# Patient Record
Sex: FEMALE | Race: WHITE | Employment: FULL TIME | URBAN - METROPOLITAN AREA
[De-identification: names, ages, dates, MRNs, and addresses within clinical notes are randomized per-mention and may not be internally consistent; named-entity substitution may affect disease eponyms.]

---

## 2017-03-07 ENCOUNTER — OFFICE VISIT (OUTPATIENT)
Dept: FAMILY MEDICINE CLINIC | Age: 42
End: 2017-03-07

## 2017-03-07 VITALS
RESPIRATION RATE: 18 BRPM | WEIGHT: 153.8 LBS | SYSTOLIC BLOOD PRESSURE: 124 MMHG | OXYGEN SATURATION: 98 % | TEMPERATURE: 98.4 F | BODY MASS INDEX: 22.78 KG/M2 | DIASTOLIC BLOOD PRESSURE: 62 MMHG | HEIGHT: 69 IN | HEART RATE: 56 BPM

## 2017-03-07 DIAGNOSIS — Z83.438 FAMILY HISTORY OF HYPERLIPIDEMIA: ICD-10-CM

## 2017-03-07 DIAGNOSIS — J30.1 SEASONAL ALLERGIC RHINITIS DUE TO POLLEN: ICD-10-CM

## 2017-03-07 DIAGNOSIS — K58.2 IRRITABLE BOWEL SYNDROME WITH BOTH CONSTIPATION AND DIARRHEA: ICD-10-CM

## 2017-03-07 DIAGNOSIS — E73.9 LACTOSE INTOLERANCE: ICD-10-CM

## 2017-03-07 DIAGNOSIS — Z11.4 SCREENING FOR HIV (HUMAN IMMUNODEFICIENCY VIRUS): ICD-10-CM

## 2017-03-07 DIAGNOSIS — Z23 ENCOUNTER FOR IMMUNIZATION: ICD-10-CM

## 2017-03-07 DIAGNOSIS — Z31.69 ENCOUNTER FOR PRECONCEPTION CONSULTATION: ICD-10-CM

## 2017-03-07 DIAGNOSIS — K90.41 GLUTEN INTOLERANCE: ICD-10-CM

## 2017-03-07 DIAGNOSIS — Z00.00 PREVENTATIVE HEALTH CARE: Primary | ICD-10-CM

## 2017-03-07 RX ORDER — HYOSCYAMINE SULFATE 0.125 MG
125 TABLET ORAL
Qty: 100 TAB | Refills: 3 | Status: SHIPPED | OUTPATIENT
Start: 2017-03-07 | End: 2018-09-17

## 2017-03-07 RX ORDER — DICYCLOMINE HYDROCHLORIDE 10 MG/1
10 CAPSULE ORAL
Qty: 100 CAP | Refills: 3 | Status: SHIPPED | OUTPATIENT
Start: 2017-03-07 | End: 2018-09-17

## 2017-03-07 RX ORDER — BISMUTH SUBSALICYLATE 262 MG
1 TABLET,CHEWABLE ORAL DAILY
COMMUNITY

## 2017-03-07 RX ORDER — CETIRIZINE HCL 10 MG
10 TABLET ORAL
COMMUNITY

## 2017-03-07 NOTE — PROGRESS NOTES
Chief Complaint   Patient presents with    New Patient     to est. care . Wants to get set up with pcp, fairly new to [address] [place] [address] in preparation for visit and have obtained necessary documentation. Identified pt with two pt identifiers (Name @ )    Health Maintenance Due   Topic    DTaP/Tdap/Td series (1 - Tdap)    PAP AKA CERVICAL CYTOLOGY     INFLUENZA AGE 9 TO ADULT          1. Have you been to the ER, urgent care clinic since your last visit? Hospitalized since your last visit? No    2. Have you seen or consulted any other health care providers outside of the [address] [place] [address] since your last visit? Include any pap smears or colon screening.  No

## 2017-03-07 NOTE — PROGRESS NOTES
Hao Rascon [address]. [facility], [address]  [phone]             Date of visit: 3/7/17   Subjective:      History obtained from:  the patient.   Reilly Valles is a 42 y.o. female who presents today for new patient, preventive care, few concerns    Stopped taking OCPs 6 months ago and doesn't feel as well  Wants to get pregnant, scheduled to see fertility center soon due to her age, has only been trying for 6 months  Tries to track [facility], didn't happen this time  Cycles fairly regular but hard to tell what is usual for her because she was on the pill so long  Little lower abd cramping today, not sure if bowels or female parts, should be starting period soon but did not ovulate this month per [facility] tracking    Long history of GI problems, worse with stress, getting worse as she gets older  In 2012 they went to [facility], she could tell she was lactose intolerant with all the cheese  Feels like she has the flu if she eats too much bread, achy, foggy, mostly cut out gluten for past year  These measures have helped  Then started to avoid FODMAPs and thinks that helps gas some  Diarrhea every AM, lots of painful gas and bloating  If she misses a BM will get constipated, often painful, feels better after a BM  Really think stress is the problem, 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)  Doesn't get heartburn or reflux often    Sees gyn for pap and mammo, has an appointment next week      Trying to eat well, plenty of fruits and veggies  Does exercise regularly    Has used propranolol prn for hand tremors    Would like any testing available before getting pregnant  Is taking her PNV and being careful to avoid cat stool    Patient Active Problem List    Diagnosis Date Noted    Seasonal allergic rhinitis due to pollen 03/07/2017    Irritable bowel syndrome with both constipation and diarrhea 03/07/2017    Lactose intolerance 03/07/2017    Gluten intolerance 03/07/2017    Family history of hyperlipidemia 03/07/2017     Current Outpatient Prescriptions   Medication Sig Dispense Refill    cetirizine (ZYRTEC) 10 mg tablet Take 10 mg by mouth daily as needed for Allergies.  multivitamin (ONE A DAY) tablet Take 1 Tab by mouth daily.  B.infantis-B.ani-B.long-B.bifi (PROBIOTIC 4X) 10-15 mg TbEC Take  by mouth daily.  omega-3s-dha-epa-fish oil (OMEGA 3) 350-400 mg cap Take  by mouth daily.  dicyclomine (BENTYL) 10 mg capsule Take 1 Cap by mouth four (4) times daily as needed. 100 Cap 3    hyoscyamine (LEVSIN) 0.125 mg tablet Take 1 Tab by mouth every four (4) hours as needed for Cramping. 100 Tab 3     Not on File  History reviewed. No pertinent past medical history. Past Surgical History:   Procedure Laterality Date    HX WISDOM TEETH EXTRACTION       Family History   Problem Relation Age of Onset    Elevated Lipids Mother     Elevated Lipids Brother      Social History   Substance Use Topics    Smoking status: Never Smoker    Smokeless tobacco: Never Used    Alcohol use No      Comment: makes her feel hot and sick      Social History     Social History Narrative                Review of Systems  Gen denies weight changes  Endo: admits to mildly irregular periods  Neuro: admits to history of menstrual migraine but has not recently   denies urinary pain  GI denies hematochezia  All: admits to seasonal allergies, zyrtec works  Psych: denies SI or depression  CV denies chest pain  Pulm: denies difficulty breathing  Skin denies lesions       Objective:     Vitals:    03/07/17 1402   BP: 124/62   Pulse: (!) 56   Resp: 18   Temp: 98.4 °F (36.9 °C)   TempSrc: Oral   SpO2: 98%   Weight: 153 lb 12.8 oz (69.8 kg)   Height: 5' 9\" (1.753 m)     Body mass index is 22.71 kg/(m^2).      General: stated age, well-developed, and in NAD  Eyes: PERRL, EOMI, no redness or drainage  Nose: no drainage  Mouth: no lesions  Throat: erythema, exudate or swelling  Neck: supple, symmetrical, trachea midline, no adenopathy and thyroid: not enlarged, symmetric, no tenderness/mass/nodules  Lungs:  clear to auscultation w/o rales, rhonchi, wheezes w/normal effort and no use of accessory muscles of respiration   Heart: regular rate and rhythm, S1, S2 normal, no murmur, click, rub or gallop  Abdomen: soft, nontender, no masses, BS normal  Ext:  No edema noted. Lymph: no cervical adenopathy appreciated  Skin:  Normal. and no rash or abnormalities   Neuro: normal gait, CN 2-12 intact  Psych: alert and oriented to person, place, time and situation and Speech: appropriate quality, quantity and organization of sentences    Assessment/Plan:       ICD-10-CM ICD-9-CM    1. Preventative health care Z00.00 V70.0 LIPID PANEL      METABOLIC PANEL, COMPREHENSIVE      CBC WITH AUTOMATED DIFF      TSH 3RD GENERATION      T4, FREE      CELIAC ANTIBODY PROFILE   2. Irritable bowel syndrome with both constipation and diarrhea K58.2 564.1 TSH 3RD GENERATION      T4, FREE   3. Seasonal allergic rhinitis due to pollen J30.1 477.0    4. Lactose intolerance E73.9 271.3    5. Gluten intolerance K90.0 579.0 CELIAC ANTIBODY PROFILE   6. Family history of hyperlipidemia Z83.49 V18.19    7. Encounter for immunization Z23 V03.89 INFLUENZA VIRUS VACCINE,(SEASONAL),SPLIT, IN INDIVIDS. >=3 YRS OF AGE, IM   8. Screening for HIV (human immunodeficiency virus) Z11.4 V73.89 HIV 1/2 AG/AB, 4TH GENERATION,W RFLX CONFIRM   9.  Encounter for preconception consultation Z31.69 V26.49 SMN1 COPY NUMBER ANALYSIS      CYSTIC FIBROSIS MUTATIONS      HEMOGLOBIN FRACTIONATION        Orders Placed This Encounter    INFLUENZA VIRUS VACCINE,(SEASONAL),SPLIT, IN INDIVIDS. >=3 YRS OF AGE, IM    LIPID PANEL    METABOLIC PANEL, COMPREHENSIVE    CBC WITH AUTOMATED DIFF    TSH 3RD GENERATION    T4, FREE    CELIAC ANTIBODY PROFILE    HIV 1/2 AG/AB, 4TH GENERATION,W RFLX CONFIRM    SMN1 COPY NUMBER ANALYSIS    CYSTIC FIBROSIS MUTATIONS    HEMOGLOBIN FRACTIONATION    cetirizine (ZYRTEC) 10 mg tablet    multivitamin (ONE A DAY) tablet    B.infantis-B.ani-B.long-B.bifi (PROBIOTIC 4X) 10-15 mg TbEC    omega-3s-dha-epa-fish oil (OMEGA 3) 350-400 mg cap    dicyclomine (BENTYL) 10 mg capsule    hyoscyamine (LEVSIN) 0.125 mg tablet       Preventive care mostly up to date, really healthy overall  Does a lot of good exercise and eats very healthfully--encouraged her to keep this up! Will do basic labs as well as labs for IBS, possible celiac  If we can control symptoms with diet, bentyl and/or levsin (giving her both to try) no need for endoscopy, as she has had these same symptoms for 20+ years. Did discuss amitriptyline as a good option for her IBS, but for now will use prn meds as she is trying to get pregnant. As a lot of her symptoms are associated with stress, I think the amitriptyline might be a great help to her in the future  Colonoscopy will be due at 49 unless new/worsening symptoms  Thinks she has had TDAP; gave flu shot today  Zyrtec working well enough for allergies, continue  Did screening tests for pre-conception counseling  Good that she will be seeing fertility center soon    Discussed the diagnosis and plan and she expressed understanding. Follow-up Disposition:  Return in about 1 year (around 3/7/2018).  or sooner prn    Sapna Hamlin MD

## 2017-03-07 NOTE — MR AVS SNAPSHOT
Visit Information Date & Time Provider Department Dept. Phone Encounter #  
 3/7/2017  2:00 PM Carter Caban, [facility] [phone] [id number] Upcoming Health Maintenance Date Due  
 PAP AKA CERVICAL CYTOLOGY 9/3/1995 INFLUENZA AGE 9 TO ADULT 8/1/2016 DTaP/Tdap/Td series (2 - Td) 9/1/2021 Allergies as of 3/7/2017  Review Complete On: 3/7/2017 By: Carter Caban MD  
 Not on File Current Immunizations  Never Reviewed Name Date Influenza Vaccine  Incomplete Not reviewed this visit You Were Diagnosed With   
  
 Codes Comments Preventative health care    -  Primary ICD-10-CM: Z00.00 ICD-9-CM: V70.0 Seasonal allergic rhinitis due to pollen     ICD-10-CM: J30.1 ICD-9-CM: 477.0 Irritable bowel syndrome with both constipation and diarrhea     ICD-10-CM: K58.2 ICD-9-CM: 406.8 Lactose intolerance     ICD-10-CM: E73.9 ICD-9-CM: 271.3 Gluten intolerance     ICD-10-CM: K90.0 ICD-9-CM: 579.0 Family history of hyperlipidemia     ICD-10-CM: Z83.49 
ICD-9-CM: V18.19 Encounter for immunization     ICD-10-CM: X60 ICD-9-CM: V03.89 Screening for HIV (human immunodeficiency virus)     ICD-10-CM: Z11.4 ICD-9-CM: V73.89 Encounter for preconception consultation     ICD-10-CM: L39.84 ICD-9-CM: V26.49 Vitals BP Pulse Temp Resp Height(growth percentile) Weight(growth percentile) 124/62 (BP 1 Location: Right arm, BP Patient Position: Sitting) (!) 56 98.4 °F (36.9 °C) (Oral) 18 5' 9\" (1.753 m) 153 lb 12.8 oz (69.8 kg) LMP SpO2 BMI OB Status Smoking Status 02/20/2017 (Exact Date) 98% 22.71 kg/m2 Having regular periods Never Smoker Vitals History BMI and BSA Data Body Mass Index Body Surface Area  
 22.71 kg/m 2 1.84 m 2 Preferred Pharmacy Pharmacy Name Phone  [facility] [phone] Your Updated Medication List  
  
   
This list is accurate as of: 3/7/17  2:48 PM.  Always use your most recent med list.  
  
  
  
  
 cetirizine 10 mg tablet Commonly known as:  ZYRTEC Take 10 mg by mouth daily as needed for Allergies. dicyclomine 10 mg capsule Commonly known as:  BENTYL Take 1 Cap by mouth four (4) times daily as needed. hyoscyamine 0.125 mg tablet Commonly known as:  Cristela Alana Take 1 Tab by mouth every four (4) hours as needed for Cramping.  
  
 multivitamin tablet Commonly known as:  ONE A DAY Take 1 Tab by mouth daily. OMEGA 3 350-400 mg Cap Generic drug:  omega-3s-dha-epa-fish oil Take  by mouth daily. PROBIOTIC 4X 10-15 mg Tbec Generic drug:  B.infantis-B.ani-B.long-B.bifi Take  by mouth daily. Prescriptions Sent to Pharmacy Refills  
 dicyclomine (BENTYL) 10 mg capsule 3 Sig: Take 1 Cap by mouth four (4) times daily as needed. Class: Normal  
 Pharmacy: [address] Ph #: [phone] Route: Oral  
 hyoscyamine (LEVSIN) 0.125 mg tablet 3 Sig: Take 1 Tab by mouth every four (4) hours as needed for Cramping. Class: Normal  
 Pharmacy: [address] Ph #: [phone] Route: Oral  
  
We Performed the Following CBC WITH AUTOMATED DIFF [24116 CPT(R)] CELIAC ANTIBODY PROFILE [[id number] Custom] CYSTIC FIBROSIS MUTATIONS [95054 CPT(R)] HEMOGLOBIN FRACTIONATION [[id number] Custom] HIV 1/2 AG/AB, 4TH GENERATION,W RFLX CONFIRM [[id number] Custom] INFLUENZA VIRUS VACCINE,(SEASONAL),SPLIT, IN INDIVIDS. >=3 YRS OF AGE, IM [17772 CPT(R)] LIPID PANEL [20430 CPT(R)] METABOLIC PANEL, COMPREHENSIVE [10107 CPT(R)] SMN1 COPY NUMBER ANALYSIS [[id number] Custom] T4, FREE [id number] CPT(R)] TSH 3RD GENERATION [92355 CPT(R)] Introducing [facility]! Quan Gomez introduces [employer] patient portal. Now you can access parts of your medical record, email your doctor's office, and request medication refills online. 1. In your internet browser, go to [email or web address] 2. Click on the First Time User? Click Here link in the Sign In box. You will see the New Member Sign Up page. 3. Enter your [employer] Access Code exactly as it appears below. You will not need to use this code after youve completed the sign-up process. If you do not sign up before the expiration date, you must request a new code. · [employer] Access Code: [email or web address] Expires: 6/5/2017  2:48 PM 
 
4. Enter the last four digits of your Social Security Number (xxxx) and Date of Birth (mm/dd/yyyy) as indicated and click Submit. You will be taken to the next sign-up page. 5. Create a [employer] ID. This will be your [employer] login ID and cannot be changed, so think of one that is secure and easy to remember. 6. Create a [employer] password. You can change your password at any time. 7. Enter your Password Reset Question and Answer. This can be used at a later time if you forget your password. 8. Enter your e-mail address. You will receive e-mail notification when new information is available in [address]. 9. Click Sign Up. You can now view and download portions of your medical record. 10. Click the Download Summary menu link to download a portable copy of your medical information. If you have questions, please visit the Frequently Asked Questions section of the [employer] website. Remember, [employer] is NOT to be used for urgent needs. For medical emergencies, dial 911. Now available from your iPhone and Android! Please provide this summary of care documentation to your next provider. Your primary care clinician is listed as Tasneem Castle.  If you have any questions after today's visit, please call [phone].

## 2017-03-08 NOTE — PATIENT INSTRUCTIONS
Irritable Bowel Syndrome: Care Instructions  Your Care Instructions  Irritable bowel syndrome, or IBS, is a problem with the intestines that causes belly pain, bloating, gas, constipation, and diarrhea. The cause of IBS is not well known. IBS can last for many years, but it does not get worse over time or lead to serious disease. Most people can control their symptoms by changing their diet and reducing stress. Follow-up care is a key part of your treatment and safety. Be sure to make and go to all appointments, and call your doctor if you are having problems. It's also a good idea to know your test results and keep a list of the medicines you take. How can you care for yourself at home? · For constipation:  ¨ Include fruits, vegetables, beans, and whole grains in your diet each day. These foods are high in fiber. ¨ Drink plenty of fluids, enough so that your urine is light yellow or clear like water. If you have kidney, heart, or liver disease and have to limit fluids, talk with your doctor before you increase the amount of fluids you drink. ¨ Get some exercise every day. Build up slowly to 30 to 60 minutes a day on 5 or more days of the week. ¨ Take a fiber supplement, such as Citrucel or Metamucil, every day if needed. Read and follow all instructions on the label. ¨ Schedule time each day for a bowel movement. Having a daily routine may help. Take your time and do not strain when having a bowel movement. · If you often have diarrhea, limit foods and drinks that make it worse. These are different for each person but may include caffeine (found in coffee, tea, chocolate, and cola drinks), alcohol, fatty foods, gas-producing foods (such as beans, cabbage, and broccoli), some dairy products, and spicy foods. Do not eat candy or gum that contains sorbitol. · Keep a daily diary of what you eat and what symptoms you have. This may help find foods that cause you problems. · Eat slowly.  Try to make mealtime relaxing. · Find ways to reduce stress. · Get at least 30 minutes of exercise on most days of the week. Exercise can help reduce tension and prevent constipation. Walking is a good choice. You also may want to do other activities, such as running, swimming, cycling, or playing tennis or team sports. When should you call for help? Call your doctor now or seek immediate medical care if:  · Your pain is different than usual or occurs with fever. · You lose weight without trying, or you lose your appetite and you do not know why. · Your symptoms often wake you from sleep. · Your stools are black and tarlike or have streaks of blood. Watch closely for changes in your health, and be sure to contact your doctor if:  · Your IBS symptoms get worse or begin to disrupt your day-to-day life. · You become more tired than usual.  · Your home treatment stops working. Where can you learn more? Go to http://amada-josh.info/. Enter W844 in the search box to learn more about \"Irritable Bowel Syndrome: Care Instructions. \"  Current as of: August 9, 2016  Content Version: 11.1  © 7183-2412 [employer]. Care instructions adapted under license by [employer] (which disclaims liability or warranty for this information). If you have questions about a medical condition or this instruction, always ask your healthcare professional. [address] any warranty or liability for your use of this information.

## 2017-03-09 ENCOUNTER — PATIENT MESSAGE (OUTPATIENT)
Dept: FAMILY MEDICINE CLINIC | Age: 42
End: 2017-03-09

## 2017-03-09 LAB
ALBUMIN SERPL-MCNC: 4.5 G/DL (ref 3.5–5.5)
ALBUMIN/GLOB SERPL: 1.9 {RATIO} (ref 1.1–2.5)
ALP SERPL-CCNC: 63 IU/L (ref 39–117)
ALT SERPL-CCNC: 23 IU/L (ref 0–32)
AST SERPL-CCNC: 17 IU/L (ref 0–40)
BASOPHILS # BLD AUTO: 0 X10E3/UL (ref 0–0.2)
BASOPHILS NFR BLD AUTO: 1 %
BILIRUB SERPL-MCNC: 0.4 MG/DL (ref 0–1.2)
BUN SERPL-MCNC: 17 MG/DL (ref 6–24)
BUN/CREAT SERPL: 23 (ref 9–23)
CALCIUM SERPL-MCNC: 9.2 MG/DL (ref 8.7–10.2)
CHLORIDE SERPL-SCNC: 100 MMOL/L (ref 96–106)
CHOLEST SERPL-MCNC: 183 MG/DL (ref 100–199)
CO2 SERPL-SCNC: 25 MMOL/L (ref 18–29)
CREAT SERPL-MCNC: 0.75 MG/DL (ref 0.57–1)
EOSINOPHIL # BLD AUTO: 0.1 X10E3/UL (ref 0–0.4)
EOSINOPHIL NFR BLD AUTO: 1 %
ERYTHROCYTE [DISTWIDTH] IN BLOOD BY AUTOMATED COUNT: 12.7 % (ref 12.3–15.4)
GLIADIN PEPTIDE IGA SER-ACNC: 3 UNITS (ref 0–19)
GLIADIN PEPTIDE IGG SER-ACNC: 5 UNITS (ref 0–19)
GLOBULIN SER CALC-MCNC: 2.4 G/DL (ref 1.5–4.5)
GLUCOSE SERPL-MCNC: 88 MG/DL (ref 65–99)
HCT VFR BLD AUTO: 39.1 % (ref 34–46.6)
HDLC SERPL-MCNC: 76 MG/DL
HGB A MFR BLD: 98 % (ref 94–98)
HGB A2 MFR BLD COLUMN CHROM: 2 % (ref 0.7–3.1)
HGB BLD-MCNC: 13.1 G/DL (ref 11.1–15.9)
HGB C MFR BLD: 0 %
HGB F MFR BLD: 0 % (ref 0–2)
HGB FRACT BLD-IMP: NORMAL
HGB S BLD QL SOLY: NEGATIVE
HGB S MFR BLD: 0 %
HIV 1+2 AB+HIV1 P24 AG SERPL QL IA: NON REACTIVE
IGA SERPL-MCNC: 255 MG/DL (ref 87–352)
IMM GRANULOCYTES # BLD: 0 X10E3/UL (ref 0–0.1)
IMM GRANULOCYTES NFR BLD: 0 %
INTERPRETATION, 910389: NORMAL
LDLC SERPL CALC-MCNC: 96 MG/DL (ref 0–99)
LYMPHOCYTES # BLD AUTO: 2.4 X10E3/UL (ref 0.7–3.1)
LYMPHOCYTES NFR BLD AUTO: 41 %
MCH RBC QN AUTO: 32.1 PG (ref 26.6–33)
MCHC RBC AUTO-ENTMCNC: 33.5 G/DL (ref 31.5–35.7)
MCV RBC AUTO: 96 FL (ref 79–97)
MONOCYTES # BLD AUTO: 0.4 X10E3/UL (ref 0.1–0.9)
MONOCYTES NFR BLD AUTO: 8 %
NEUTROPHILS # BLD AUTO: 2.9 X10E3/UL (ref 1.4–7)
NEUTROPHILS NFR BLD AUTO: 49 %
PLATELET # BLD AUTO: 208 X10E3/UL (ref 150–379)
POTASSIUM SERPL-SCNC: 4.3 MMOL/L (ref 3.5–5.2)
PROT SERPL-MCNC: 6.9 G/DL (ref 6–8.5)
RBC # BLD AUTO: 4.08 X10E6/UL (ref 3.77–5.28)
SODIUM SERPL-SCNC: 143 MMOL/L (ref 134–144)
T4 FREE SERPL-MCNC: 1.35 NG/DL (ref 0.82–1.77)
TRIGL SERPL-MCNC: 57 MG/DL (ref 0–149)
TSH SERPL DL<=0.005 MIU/L-ACNC: 1.72 UIU/ML (ref 0.45–4.5)
TTG IGA SER-ACNC: <2 U/ML (ref 0–3)
TTG IGG SER-ACNC: <2 U/ML (ref 0–5)
VLDLC SERPL CALC-MCNC: 11 MG/DL (ref 5–40)
WBC # BLD AUTO: 5.9 X10E3/UL (ref 3.4–10.8)

## 2017-03-13 LAB
ANNOTATION COMMENT IMP: NORMAL
CARRIER DETECTION RATE, 450111: NORMAL
CFTR MUT ANL BLD/T: NORMAL
CLIENT SPECIMEN ID, 450002: NORMAL
CLINICAL DATA, 450005: NORMAL
COMMENT: 480556: NORMAL
DISCLAIMER, 450013: NORMAL
ELECTRONICALLY SIGNED BY, 450014: NORMAL
ETHNIC BACKGROUND STATED: NORMAL
GENETIC COUNSELOR, 450001: NORMAL
REF LAB TEST METHOD: NORMAL
REFERENCES, 450012: NORMAL
SMN1 GENE MUT ANL BLD/T: NORMAL
SMN1 GENE MUT ANL BLD/T: NORMAL
SPECIMEN SOURCE: NORMAL
SPECIMEN(S) RECEIVED, 450004: NORMAL

## 2017-03-20 NOTE — TELEPHONE ENCOUNTER
From: Ramakrishna Salcedo  Sent: 3/20/2017 4:30 PM EDT  To: Gregoria Mosquera MD  Subject: RE:other genetic tests pending    Hi Dr John Gonzalez! Thanks for the messages. I got one last week saying the two additional genetic tests were complete but they don't have results if I click on them. Please let me know when you can if you know those results. We have our appt at the fertility clinic in a week. My insurance apparently only covers the dicyclomine so I didn't get the other. Seems to really help the cramping and gurgling. I am noticing however that I'm having more VPCs (or PVCs), do you think this med could be doing that? I haven't taken it in a couple days to see if that helps and will let you know. Thanks! Allie Coy. ----- Message -----  From: Gregoria Mosquera MD  Sent: 3/9/2017 5:40 PM EST  To: Ramakrishna Salcedo  Subject: other genetic tests pending    I forgot to say that the 2 other genetic tests (for cystic fibrosis or spinal muscular atrophy carrier) are still pending!  Gregoria Mosquera MD 3/9/2017 5:40 PM

## 2017-03-28 ENCOUNTER — DOCUMENTATION ONLY (OUTPATIENT)
Dept: FAMILY MEDICINE CLINIC | Age: 42
End: 2017-03-28

## 2017-03-28 NOTE — PROGRESS NOTES
Spoke to pt and explained what the insurance lady had said about being an EPO. She states that she just got off the phone with her insurance and they said that Dr Deanna Figueroa is par with her insurance and that the problem is that Dr Cristhian Perez claim hasn't gone thru yet and we can't contact them about the labs until the actual claims go thru.   Pt will send me an email once the claims go thru and I will call her insurance to let them know that neither the pt or us knew that labs were to go thru [facility].

## 2017-06-05 ENCOUNTER — TELEPHONE (OUTPATIENT)
Dept: FAMILY MEDICINE CLINIC | Age: 42
End: 2017-06-05

## 2017-06-05 NOTE — TELEPHONE ENCOUNTER
I have faxed over the results as requested by pt on My chart. I have confirmation that it went thru.

## 2017-06-05 NOTE — TELEPHONE ENCOUNTER
----- Message from Georgie Kyle sent at 6/2/2017  4:14 PM EDT -----  Regarding: Dr Ma/telephone  Pt (p) [phone], would like to know if she can get a copy of her lab test results for her to , she said the copy on My Chart is not official enough to give to another MD office, they need a copy of her Negative HIV results and her name must be on it,. She said she can come pick it up when ready.

## 2018-09-17 ENCOUNTER — OFFICE VISIT (OUTPATIENT)
Dept: FAMILY MEDICINE CLINIC | Age: 43
End: 2018-09-17

## 2018-09-17 VITALS
TEMPERATURE: 98.2 F | DIASTOLIC BLOOD PRESSURE: 81 MMHG | RESPIRATION RATE: 18 BRPM | SYSTOLIC BLOOD PRESSURE: 125 MMHG | WEIGHT: 156 LBS | HEART RATE: 67 BPM | HEIGHT: 69 IN | BODY MASS INDEX: 23.11 KG/M2 | OXYGEN SATURATION: 99 %

## 2018-09-17 DIAGNOSIS — M43.9 CURVATURE OF SPINE: ICD-10-CM

## 2018-09-17 DIAGNOSIS — R29.898 HEAVY SENSATION OF LOWER EXTREMITY: ICD-10-CM

## 2018-09-17 DIAGNOSIS — R55 POSTURAL DIZZINESS WITH PRESYNCOPE: ICD-10-CM

## 2018-09-17 DIAGNOSIS — G62.9 PERIPHERAL POLYNEUROPATHY: Primary | ICD-10-CM

## 2018-09-17 DIAGNOSIS — R42 POSTURAL DIZZINESS WITH PRESYNCOPE: ICD-10-CM

## 2018-09-17 RX ORDER — NAPROXEN SODIUM 220 MG
220 TABLET ORAL 2 TIMES DAILY WITH MEALS
COMMUNITY

## 2018-09-17 RX ORDER — MECLIZINE HCL 12.5 MG 12.5 MG/1
TABLET ORAL
COMMUNITY
End: 2019-11-26

## 2018-09-17 NOTE — MR AVS SNAPSHOT
[address] Betito Pearson [address] 
[phone] Patient: Latonya Malcolm MRN: [id number] PLR:[id number]/8/8712 Visit Information Date & Time Provider Department Dept. Phone Encounter #  
 9/17/2018 10:00 AM Mervat Escobar  [facility] [phone] [id number] Upcoming Health Maintenance Date Due  
 PAP AKA CERVICAL CYTOLOGY 9/3/1995 Influenza Age 5 to Adult 8/1/2018 DTaP/Tdap/Td series (2 - Td) 9/1/2021 Allergies as of 9/17/2018  Review Complete On: 9/17/2018 By: Lesly Chen LPN Not on File Current Immunizations  Reviewed on 9/14/2018 Name Date Influenza Vaccine 3/7/2017 Not reviewed this visit You Were Diagnosed With   
  
 Codes Comments Peripheral polyneuropathy    -  Primary ICD-10-CM: G62.9 ICD-9-CM: 356.9 Postural dizziness with presyncope     ICD-10-CM: R42, R55 
ICD-9-CM: 780.4, 780.2 Heavy sensation of lower extremity     ICD-10-CM: R29.898 ICD-9-CM: 729.89 Curvature of spine     ICD-10-CM: M43.9 ICD-9-CM: 737.9 Vitals BP Pulse Temp Resp Height(growth percentile) Weight(growth percentile) 125/81 (BP 1 Location: Left arm, BP Patient Position: Sitting) 67 98.2 °F (36.8 °C) (Oral) 18 5' 9\" (1.753 m) 156 lb (70.8 kg) LMP SpO2 BMI OB Status Smoking Status 09/02/2018 (Exact Date) 99% 23.04 kg/m2 Having regular periods Never Smoker BMI and BSA Data Body Mass Index Body Surface Area 23.04 kg/m 2 1.86 m 2 Preferred Pharmacy Pharmacy Name Phone [facility] [phone] Your Updated Medication List  
  
   
This list is accurate as of 9/17/18 10:48 AM.  Always use your most recent med list.  
  
  
  
  
 ALEVE 220 mg tablet Generic drug:  naproxen sodium Take 220 mg by mouth two (2) times daily (with meals). cetirizine 10 mg tablet Commonly known as:  ZYRTEC Take 10 mg by mouth daily as needed for Allergies. dicyclomine 10 mg capsule Commonly known as:  BENTYL Take 1 Cap by mouth four (4) times daily as needed. hyoscyamine 0.125 mg tablet Commonly known as:  Little Pair Take 1 Tab by mouth every four (4) hours as needed for Cramping. meclizine 12.5 mg tablet Commonly known as:  ANTIVERT Take  by mouth three (3) times daily as needed. multivitamin tablet Commonly known as:  ONE A DAY Take 1 Tab by mouth daily. OMEGA 3 350-400 mg Cap Generic drug:  omega-3s-dha-epa-fish oil Take  by mouth daily. PROBIOTIC 4X 10-15 mg Tbec Generic drug:  B.infantis-B.ani-B.long-B.bifi Take  by mouth daily. We Performed the Following HEAVY METALS PROFILE I, BLOOD [14893 CPT(R)] REFERRAL TO NEUROLOGY [PBU22 Custom] Comments:  
 Please evaluate patient with tilt table test for vertigo, peripheral neuropathy, positional presyncope. SED RATE (ESR) [id number] CPT(R)] To-Do List   
 09/17/2018 Imaging:  XR SPINE LUMB 2 OR 3 V   
  
 09/17/2018 Imaging:  XR SPINE THORAC 3 V Referral Information Referral ID Referred By Referred To  
  
 [id number] Anibal Patel MD   
   [address] [place] [address] Phone: [phone] Fax: [phone] Visits Status Start Date End Date 1 New Request 9/17/18 9/17/19 If your referral has a status of pending review or denied, additional information will be sent to support the outcome of this decision. Patient Instructions Dizziness: Care Instructions Your Care Instructions Dizziness is the feeling of unsteadiness or fuzziness in your head. It is different than having vertigo, which is a feeling that the room is spinning or that you are moving or falling.  It is also different from lightheadedness, which is the feeling that you are about to faint. It can be hard to know what causes dizziness. Some people feel dizzy when they have migraine headaches. Sometimes bouts of flu can make you feel dizzy. Some medical conditions, such as heart problems or high blood pressure, can make you feel dizzy. Many medicines can cause dizziness, including medicines for high blood pressure, pain, or anxiety. If a medicine causes your symptoms, your doctor may recommend that you stop or change the medicine. If it is a problem with your heart, you may need medicine to help your heart work better. If there is no clear reason for your symptoms, your doctor may suggest watching and waiting for a while to see if the dizziness goes away on its own. Follow-up care is a key part of your treatment and safety. Be sure to make and go to all appointments, and call your doctor if you are having problems. It's also a good idea to know your test results and keep a list of the medicines you take. How can you care for yourself at home? · If your doctor recommends or prescribes medicine, take it exactly as directed. Call your doctor if you think you are having a problem with your medicine. · Do not drive while you feel dizzy. · Try to prevent falls. Steps you can take include: ¨ Using nonskid mats, adding grab bars near the tub, and using night-lights. ¨ Clearing your home so that walkways are free of anything you might trip on. ¨ Letting family and friends know that you have been feeling dizzy. This will help them know how to help you. When should you call for help? Call 911 anytime you think you may need emergency care. For example, call if: 
  · You passed out (lost consciousness).  
  · You have dizziness along with symptoms of a heart attack. These may include: ¨ Chest pain or pressure, or a strange feeling in the chest. 
¨ Sweating. ¨ Shortness of breath. ¨ Nausea or vomiting. ¨ Pain, pressure, or a strange feeling in the back, neck, jaw, or upper belly or in one or both shoulders or arms. ¨ Lightheadedness or sudden weakness. ¨ A fast or irregular heartbeat.  
  · You have symptoms of a stroke. These may include: 
¨ Sudden numbness, tingling, weakness, or loss of movement in your face, arm, or leg, especially on only one side of your body. ¨ Sudden vision changes. ¨ Sudden trouble speaking. ¨ Sudden confusion or trouble understanding simple statements. ¨ Sudden problems with walking or balance. ¨ A sudden, severe headache that is different from past headaches.  
 Call your doctor now or seek immediate medical care if: 
  · You feel dizzy and have a fever, headache, or ringing in your ears.  
  · You have new or increased nausea and vomiting.  
  · Your dizziness does not go away or comes back.  
 Watch closely for changes in your health, and be sure to contact your doctor if: 
  · You do not get better as expected. Where can you learn more? Go to http://amada-josh.info/. Enter X888 in the search box to learn more about \"Dizziness: Care Instructions. \" Current as of: November 20, 2017 Content Version: 11.7 © 5364-7759 [employer]. Care instructions adapted under license by [employer] (which disclaims liability or warranty for this information). If you have questions about a medical condition or this instruction, always ask your healthcare professional. [place] [address] any warranty or liability for your use of this information. Neuropathic Pain: Care Instructions Your Care Instructions Neuropathic pain is caused by pressure on or damage to your nerves. It's often simply called nerve pain. Some people feel this type of pain all the time. For others, it comes and goes. Diabetes, shingles, or an injury can cause nerve pain.  Many people say the pain feels sharp, burning, or stabbing. But some people feel it as a dull ache. In some cases, it makes your skin very sensitive. So touch, pressure, and other sensations that did not hurt before may now cause pain. It's important to know that this kind of pain is real and can affect your quality of life. It's also important to know that treatment can help. Treatment includes pain medicines, exercise, and physical therapy. Medicines can help reduce the number of pain signals that travel over the nerves. This can make the painful areas less sensitive. It can also help you sleep better and improve your mood. But medicines are only one part of successful treatment. Most people do best with more than one kind of treatment. Your doctor may recommend that you try cognitive-behavioral therapy and stress management. Or, if needed, you may decide to try to quit smoking, lower your blood pressure, or better control blood sugar. These kinds of healthy changes can also make a difference. If you feel that your treatment is not working, talk to your doctor. And be sure to tell your doctor if you think you might be depressed or anxious. These are common problems that can also be treated. Follow-up care is a key part of your treatment and safety. Be sure to make and go to all appointments, and call your doctor if you are having problems. It's also a good idea to know your test results and keep a list of the medicines you take. How can you care for yourself at home? · Be safe with medicines. Read and follow all instructions on the label. ¨ If the doctor gave you a prescription medicine for pain, take it as prescribed. ¨ If you are not taking a prescription pain medicine, ask your doctor if you can take an over-the-counter medicine. · Save hard tasks for days when you have less pain. Follow a hard task with an easy task. And remember to take breaks. · Relax, and reduce stress.  You may want to try deep breathing or meditation. These can help. · Keep moving. Gentle, daily exercise can help reduce pain. Your doctor or physical therapist can tell you what type of exercise is best for you. This may include walking, swimming, and stationary biking. It may also include stretches and range-of-motion exercises. · Try heat, cold packs, and massage. · Get enough sleep. Constant pain can make you more tired. If the pain makes it hard to sleep, talk with your doctor. · Think positively. Your thoughts can affect your pain. Do fun things to distract yourself from the pain. See a movie, read a book, listen to music, or spend time with a friend. · Keep a pain diary. Try to write down how strong your pain is and what it feels like. Also try to notice and write down how your moods, thoughts, sleep, activities, and medicine affect your pain. These notes can help you and your doctor find the best ways to treat your pain. Reducing constipation caused by pain medicine Pain medicines often cause constipation. To reduce constipation: 
· Include fruits, vegetables, beans, and whole grains in your diet each day. These foods are high in fiber. · Drink plenty of fluids, enough so that your urine is light yellow or clear like water. If you have kidney, heart, or liver disease and have to limit fluids, talk with your doctor before you increase the amount of fluids you drink. · Get some exercise every day. Build up slowly to 30 to 60 minutes a day on 5 or more days of the week. · Take a fiber supplement, such as Citrucel or Metamucil, every day if needed. Read and follow all instructions on the label. · Schedule time each day for a bowel movement. Having a daily routine may help. Take your time and do not strain when having a bowel movement. · Ask your doctor about a laxative. The goal is to have one easy bowel movement every 1 to 2 days. Do not let constipation go untreated for more than 3 days. When should you call for help? Call your doctor now or seek immediate medical care if: 
  · You feel sad, anxious, or hopeless for more than a few days. This could mean you are depressed. Depression is common in people who have a lot of pain. But it can be treated.  
  · You have trouble with bowel movements, such as: 
¨ No bowel movement in 3 days. ¨ Blood in the anal area, in your stool, or on the toilet paper. ¨ Diarrhea for more than 24 hours.  
 Watch closely for changes in your health, and be sure to contact your doctor if: 
  · Your pain is getting worse.  
  · You can't sleep because of pain.  
  · You are very worried or anxious about your pain.  
  · You have trouble taking your pain medicine.  
  · You have any concerns about your pain medicine or its side effects.  
  · You have vomiting or cramps for more than 2 hours. Where can you learn more? Go to http://amada-josh.info/. Enter X005 in the search box to learn more about \"Neuropathic Pain: Care Instructions. \" Current as of: October 9, 2017 Content Version: 11.7 © 5360-4535 [employer]. Care instructions adapted under license by [employer] (which disclaims liability or warranty for this information). If you have questions about a medical condition or this instruction, always ask your healthcare professional. [address] any warranty or liability for your use of this information. Vasovagal Syncope: Care Instructions Your Care Instructions Vasovagal syncope (say \"jtj-rzz-QHT-gul RFYW-elx-vai\")is sudden dizziness or fainting that can be set off by things such as pain, stress, fear, or trauma. You may sweat or feel lightheaded, sick to your stomach, or tingly. The problem causes the heart rate to slow and the blood vessels to widen, or dilate, for a short time. When this happens, blood pools in the lower body, and less blood goes to the brain. You can usually get relief by lying down with your legs raised (elevated). This helps more blood to flow to your brain and may help relieve symptoms like feeling dizzy. Some doctors may recommend a technique that involves tensing your fists and arms. This type of fainting is often easy to predict. For example, it happens to some people when they see blood or have to get a shot. They may feel symptoms before they faint. An episode of vasovagal syncope usually responds well to self-care. Other treatment often isn't needed. But if the fainting keeps happening, your doctor may suggest further treatments. Follow-up care is a key part of your treatment and safety. Be sure to make and go to all appointments, and call your doctor if you are having problems. It's also a good idea to know your test results and keep a list of the medicines you take. How can you care for yourself at home? · Drink plenty of fluids to prevent dehydration. If you have kidney, heart, or liver disease and have to limit fluids, talk with your doctor before you increase your fluid intake. · Try to avoid things that you think may set off vasovagal syncope. · Talk to your doctor about any medicines you take. Some medicines may increase the chance of this condition occurring. · If you feel symptoms, lie down with your legs raised. Talk to your doctor about what to do if your symptoms come back. When should you call for help? Call 911 anytime you think you may need emergency care. For example, call if: 
  · You have symptoms of a heart problem. These may include: ¨ Chest pain or pressure. ¨ Severe trouble breathing. ¨ A fast or irregular heartbeat.  
 Watch closely for changes in your health, and be sure to contact your doctor if: 
  · You have more episodes of fainting at home.  
  · You do not get better as expected. Where can you learn more? Go to http://amada-josh.info/. Enter L754 in the search box to learn more about \"Vasovagal Syncope: Care Instructions. \" Current as of: November 20, 2017 Content Version: 11.7 © 0048-0758 [employer]. Care instructions adapted under license by [employer] (which disclaims liability or warranty for this information). If you have questions about a medical condition or this instruction, always ask your healthcare professional. [address] any warranty or liability for your use of this information. Introducing [facility]! Dear Sonal Douglass: Thank you for requesting a about.me account. Our records indicate that you already have an active [email or web address] account. You can access your account anytime at [email or web address]. [email or web address] Did you know that you can access your hospital and ER discharge instructions at any time in about.me? You can also review all of your test results from your hospital stay or ER visit. Additional Information If you have questions, please visit the Frequently Asked Questions section of the [email or web address] website at [email or web address] Remember, about.me is NOT to be used for urgent needs. For medical emergencies, dial 911. Now available from your iPhone and Android! Please provide this summary of care documentation to your next provider. Your primary care clinician is listed as Ashlyn Begum. If you have any questions after today's visit, please call [phone].

## 2018-09-17 NOTE — PATIENT INSTRUCTIONS
Dizziness: Care Instructions Your Care Instructions Dizziness is the feeling of unsteadiness or fuzziness in your head. It is different than having vertigo, which is a feeling that the room is spinning or that you are moving or falling. It is also different from lightheadedness, which is the feeling that you are about to faint. It can be hard to know what causes dizziness. Some people feel dizzy when they have migraine headaches. Sometimes bouts of flu can make you feel dizzy. Some medical conditions, such as heart problems or high blood pressure, can make you feel dizzy. Many medicines can cause dizziness, including medicines for high blood pressure, pain, or anxiety. If a medicine causes your symptoms, your doctor may recommend that you stop or change the medicine. If it is a problem with your heart, you may need medicine to help your heart work better. If there is no clear reason for your symptoms, your doctor may suggest watching and waiting for a while to see if the dizziness goes away on its own. Follow-up care is a key part of your treatment and safety. Be sure to make and go to all appointments, and call your doctor if you are having problems. It's also a good idea to know your test results and keep a list of the medicines you take. How can you care for yourself at home? · If your doctor recommends or prescribes medicine, take it exactly as directed. Call your doctor if you think you are having a problem with your medicine. · Do not drive while you feel dizzy. · Try to prevent falls. Steps you can take include: ¨ Using nonskid mats, adding grab bars near the tub, and using night-lights. ¨ Clearing your home so that walkways are free of anything you might trip on. ¨ Letting family and friends know that you have been feeling dizzy. This will help them know how to help you. When should you call for help? Call 911 anytime you think you may need emergency care. For example, call if:   · You passed out (lost consciousness).  
  · You have dizziness along with symptoms of a heart attack. These may include: ¨ Chest pain or pressure, or a strange feeling in the chest. 
¨ Sweating. ¨ Shortness of breath. ¨ Nausea or vomiting. ¨ Pain, pressure, or a strange feeling in the back, neck, jaw, or upper belly or in one or both shoulders or arms. ¨ Lightheadedness or sudden weakness. ¨ A fast or irregular heartbeat.  
  · You have symptoms of a stroke. These may include: 
¨ Sudden numbness, tingling, weakness, or loss of movement in your face, arm, or leg, especially on only one side of your body. ¨ Sudden vision changes. ¨ Sudden trouble speaking. ¨ Sudden confusion or trouble understanding simple statements. ¨ Sudden problems with walking or balance. ¨ A sudden, severe headache that is different from past headaches.  
 Call your doctor now or seek immediate medical care if: 
  · You feel dizzy and have a fever, headache, or ringing in your ears.  
  · You have new or increased nausea and vomiting.  
  · Your dizziness does not go away or comes back.  
 Watch closely for changes in your health, and be sure to contact your doctor if: 
  · You do not get better as expected. Where can you learn more? Go to http://amada-josh.info/. Enter H199 in the search box to learn more about \"Dizziness: Care Instructions. \" Current as of: November 20, 2017 Content Version: 11.7 © 8311-1429 [employer]. Care instructions adapted under license by [employer] (which disclaims liability or warranty for this information). If you have questions about a medical condition or this instruction, always ask your healthcare professional. [place] [address] any warranty or liability for your use of this information. Neuropathic Pain: Care Instructions Your Care Instructions Neuropathic pain is caused by pressure on or damage to your nerves.  It's often simply called nerve pain. Some people feel this type of pain all the time. For others, it comes and goes. Diabetes, shingles, or an injury can cause nerve pain. Many people say the pain feels sharp, burning, or stabbing. But some people feel it as a dull ache. In some cases, it makes your skin very sensitive. So touch, pressure, and other sensations that did not hurt before may now cause pain. It's important to know that this kind of pain is real and can affect your quality of life. It's also important to know that treatment can help. Treatment includes pain medicines, exercise, and physical therapy. Medicines can help reduce the number of pain signals that travel over the nerves. This can make the painful areas less sensitive. It can also help you sleep better and improve your mood. But medicines are only one part of successful treatment. Most people do best with more than one kind of treatment. Your doctor may recommend that you try cognitive-behavioral therapy and stress management. Or, if needed, you may decide to try to quit smoking, lower your blood pressure, or better control blood sugar. These kinds of healthy changes can also make a difference. If you feel that your treatment is not working, talk to your doctor. And be sure to tell your doctor if you think you might be depressed or anxious. These are common problems that can also be treated. Follow-up care is a key part of your treatment and safety. Be sure to make and go to all appointments, and call your doctor if you are having problems. It's also a good idea to know your test results and keep a list of the medicines you take. How can you care for yourself at home? · Be safe with medicines. Read and follow all instructions on the label. ¨ If the doctor gave you a prescription medicine for pain, take it as prescribed. ¨ If you are not taking a prescription pain medicine, ask your doctor if you can take an over-the-counter medicine. · Save hard tasks for days when you have less pain. Follow a hard task with an easy task. And remember to take breaks. · Relax, and reduce stress. You may want to try deep breathing or meditation. These can help. · Keep moving. Gentle, daily exercise can help reduce pain. Your doctor or physical therapist can tell you what type of exercise is best for you. This may include walking, swimming, and stationary biking. It may also include stretches and range-of-motion exercises. · Try heat, cold packs, and massage. · Get enough sleep. Constant pain can make you more tired. If the pain makes it hard to sleep, talk with your doctor. · Think positively. Your thoughts can affect your pain. Do fun things to distract yourself from the pain. See a movie, read a book, listen to music, or spend time with a friend. · Keep a pain diary. Try to write down how strong your pain is and what it feels like. Also try to notice and write down how your moods, thoughts, sleep, activities, and medicine affect your pain. These notes can help you and your doctor find the best ways to treat your pain. Reducing constipation caused by pain medicine Pain medicines often cause constipation. To reduce constipation: 
· Include fruits, vegetables, beans, and whole grains in your diet each day. These foods are high in fiber. · Drink plenty of fluids, enough so that your urine is light yellow or clear like water. If you have kidney, heart, or liver disease and have to limit fluids, talk with your doctor before you increase the amount of fluids you drink. · Get some exercise every day. Build up slowly to 30 to 60 minutes a day on 5 or more days of the week. · Take a fiber supplement, such as Citrucel or Metamucil, every day if needed. Read and follow all instructions on the label. · Schedule time each day for a bowel movement. Having a daily routine may help. Take your time and do not strain when having a bowel movement. · Ask your doctor about a laxative. The goal is to have one easy bowel movement every 1 to 2 days. Do not let constipation go untreated for more than 3 days. When should you call for help? Call your doctor now or seek immediate medical care if: 
  · You feel sad, anxious, or hopeless for more than a few days. This could mean you are depressed. Depression is common in people who have a lot of pain. But it can be treated.  
  · You have trouble with bowel movements, such as: 
¨ No bowel movement in 3 days. ¨ Blood in the anal area, in your stool, or on the toilet paper. ¨ Diarrhea for more than 24 hours.  
 Watch closely for changes in your health, and be sure to contact your doctor if: 
  · Your pain is getting worse.  
  · You can't sleep because of pain.  
  · You are very worried or anxious about your pain.  
  · You have trouble taking your pain medicine.  
  · You have any concerns about your pain medicine or its side effects.  
  · You have vomiting or cramps for more than 2 hours. Where can you learn more? Go to http://amada-josh.info/. Enter B994 in the search box to learn more about \"Neuropathic Pain: Care Instructions. \" Current as of: October 9, 2017 Content Version: 11.7 © 7297-6367 [employer]. Care instructions adapted under license by [employer] (which disclaims liability or warranty for this information). If you have questions about a medical condition or this instruction, always ask your healthcare professional. [place] [address] any warranty or liability for your use of this information. Vasovagal Syncope: Care Instructions Your Care Instructions Vasovagal syncope (say \"[id number]-[id number] [id number]-[id number]\")is sudden dizziness or fainting that can be set off by things such as pain, stress, fear, or trauma. You may sweat or feel lightheaded, sick to your stomach, or tingly. The problem causes the heart rate to slow and the blood vessels to widen, or dilate, for a short time. When this happens, blood pools in the lower body, and less blood goes to the brain. You can usually get relief by lying down with your legs raised (elevated). This helps more blood to flow to your brain and may help relieve symptoms like feeling dizzy. Some doctors may recommend a technique that involves tensing your fists and arms. This type of fainting is often easy to predict. For example, it happens to some people when they see blood or have to get a shot. They may feel symptoms before they faint. An episode of vasovagal syncope usually responds well to self-care. Other treatment often isn't needed. But if the fainting keeps happening, your doctor may suggest further treatments. Follow-up care is a key part of your treatment and safety. Be sure to make and go to all appointments, and call your doctor if you are having problems. It's also a good idea to know your test results and keep a list of the medicines you take. How can you care for yourself at home? · Drink plenty of fluids to prevent dehydration. If you have kidney, heart, or liver disease and have to limit fluids, talk with your doctor before you increase your fluid intake. · Try to avoid things that you think may set off vasovagal syncope. · Talk to your doctor about any medicines you take. Some medicines may increase the chance of this condition occurring. · If you feel symptoms, lie down with your legs raised. Talk to your doctor about what to do if your symptoms come back. When should you call for help? Call 911 anytime you think you may need emergency care. For example, call if: 
  · You have symptoms of a heart problem. These may include: ¨ Chest pain or pressure. ¨ Severe trouble breathing. ¨ A fast or irregular heartbeat.  
 Watch closely for changes in your health, and be sure to contact your doctor if:   · You have more episodes of fainting at home.  
  · You do not get better as expected. Where can you learn more? Go to http://amada-josh.info/. Enter L754 in the search box to learn more about \"Vasovagal Syncope: Care Instructions. \" Current as of: November 20, 2017 Content Version: 11.7 © 7886-9440 [employer]. Care instructions adapted under license by [employer] (which disclaims liability or warranty for this information). If you have questions about a medical condition or this instruction, always ask your healthcare professional. [place] [address] any warranty or liability for your use of this information.

## 2018-09-17 NOTE — PROGRESS NOTES
[facility] Note Subjective: Chief Complaint Patient presents with  
[facility] Follow Up [facility] ER for dizziness. Malena Monte is a 39y.o. year old female who presents for evaluation of the following: 
 
Hospital Follow Up: [facility] 9/14/18 Reason for Admission: Presyncope Dx: Recurrent presyncope with associated paresthesias Per History and physical A&P:  
(Attending) patient seen and examined on 9/14 Stable on examination Admitted with concern for syncope/presyncope Complex symptomology that has been progressing X3 to 4 weeks; 
Current differential is neuro cardiogenic syncope (will refer for up till) VS neurologic syncope (Autonomic instability from underlying neuro process and (versus psychogenic Also compounded by possible vertigo which may or may not be related; seems to have positive response to Hughes-Hallpike and admits to history of quotation sensitive vestibular system\" with vertigo and passing out when younger/carsicknesstaking meclizine Waiting for neuro Vadim as patient with some signs of upper motor neuron dysfunction and may have an underlying neuro process such as ALS spinal MSlikely will recommend OP follow-up inpatient hometown with more neuro testing; and possible MRI spine will check orthostatics and echo and refer for tilt table For neuropathic type symptoms which may or may not be related to Checking TSH 
B12 normal 
No evidence of diabetes Working out for neuro disease Imaging, labs, provider notes, discharge summary reviewed. Pertinent Findings: 
Discharge summary not available at time of visit Neurology consult A&P: Her MRI wit h and without contrast was unremarkable which all blood rules out multiple sclerosis. Vasovagal syncope is possible. Lobo-Hallpike test was positive with head turning to the left, some point a peripheral vertigo as a definite possibility. Recommend observation overnight on telemetry for vertical multiple episodes of presyncope that appeared to have escalated today. Meclizine 12.5 mg 3 times daily as needed for vertigo She will need outpatient syncopal workup to include Holter monitor, tilt table test, T/L-spine imaging, basic peripheral neuropathy workup disease B12, BMP, HbA1c, Lyme, HIV, SPEP/UPEP) MRA brain, MRI brain and MRA neck w/ contrast all normal. 
TYRESE Normal EF 60-65%. No regional wall motion abnormalities. Wall thickness normal.  Diastolic function normal. 
 
Interval Events:  
Syncope/ PreSyncope: Onset 3 weeks ago Had epidural during childbirth 3 months ago and Liletta IUD prior to symptom onset Symptoms began with an episode of syncope once when standing holding her child. Later dizziness and lightheadedness, worse with sitting Felt presymcopal while walking around in [employer] Seen at [facility] on 9/30/2018 with negative CT head and normal labs Seen by OB afterwards to have liletta IUD removed. Normal thyroid testing by OB [place] better over time with symptoms occurring only in the morning Traveled to [facility] for  conference. There she felt and \"electrical zing\" down legs and arm, initially relieved with rest then recurred when at the store She then developed more lightheaded and feeling off balance then legs felt heavy. Associated tingling in feet and hands. After this she presented to the hospital as detailed above. Interval Events:  
Intermittent Lightheadedness Right wrist tingling Tx: Meclizine with inconsistent effects  requests heavy metals testing Works as a  Review of Systems Pertinent positives and negative per HPI. All other systems  reviewed are negative for a Comprehensive ROS (10+). History reviewed. No pertinent past medical history. Social History Social History  Marital status:    Spouse name: N/A  
 Number of children: N/A  
  Years of education: N/A Occupational History  Not on file. Social History Main Topics  Smoking status: Never Smoker  Smokeless tobacco: Never Used  Alcohol use No  
   Comment: makes her feel hot and sick  Drug use: No  
 Sexual activity: Yes  
  Partners: Male Birth control/ protection: None Other Topics Concern  Not on file Social History Narrative   
  Current Outpatient Prescriptions Medication Sig  
 meclizine (ANTIVERT) 12.5 mg tablet Take  by mouth three (3) times daily as needed.  naproxen sodium (ALEVE) 220 mg tablet Take 220 mg by mouth two (2) times daily (with meals).  cetirizine (ZYRTEC) 10 mg tablet Take 10 mg by mouth daily as needed for Allergies.  multivitamin (ONE A DAY) tablet Take 1 Tab by mouth daily.  B.infantis-B.ani-B.long-B.bifi (PROBIOTIC 4X) 10-15 mg TbEC Take  by mouth daily.  omega-3s-dha-epa-fish oil (OMEGA 3) 350-400 mg cap Take  by mouth daily. No current facility-administered medications for this visit. Objective:  
 
Vitals:  
 09/17/18 1007 BP: 125/81 Pulse: 67 Resp: 18 Temp: 98.2 °F (36.8 °C) TempSrc: Oral  
SpO2: 99% Weight: 156 lb (70.8 kg) Height: 5' 9\" (1.753 m) Physical Examination: 
General: Alert, cooperative, no distress, appears stated age. Eyes: Conjunctivae clear. PERRL, EOMs intact. Ears: Normal external ear canals both ears. TM clear and mobile bilaterally Nose: Nares normal. Septum midline. Mucosa normal. No drainage or sinus tenderness. Mouth/Throat: Lips, mucosa, and tongue normal.  
Neck: Supple, symmetrical, trachea midline, no adenopathy. No thyroid enlargement/tenderness/nodules Back: Symmetric, no curvature. ROM normal.  No spinal tenderness Lungs: Clear to auscultation bilaterally. Normal inspiratory and expiratory ratio. Heart: Regular rate and rhythm, S1, S2 normal, no murmur, click, rub or gallop. Abdomen: Soft, non-tender. Extremities: Extremities normal, atraumatic, no cyanosis or edema. MSK: Right shoulder lower than left Pulses: 2+ and symmetric all extremities. Skin: Skin color, texture, turgor normal. No rashes or lesions on exposed skin. Lymph nodes: Cervical, supraclavicular nodes normal. 
Neurologic: CNII-XII intact. Finger to nose test normal.  Strength 5/5 grossly. Sensation and reflexes normal throughout. Psych: Smiling, laughing, no psychomotor agitation or retardation. Speech content/volume/speed is normal.  Well-kempt. No visits with results within 3 Month(s) from this visit. Latest known visit with results is: 
 
Office Visit on 03/07/2017 Component Date Value Ref Range Status  Cholesterol, total 03/07/2017 183  100 - 199 mg/dL Final  
 Triglyceride 03/07/2017 57  0 - 149 mg/dL Final  
 HDL Cholesterol 03/07/2017 76  >39 mg/dL Final  
 VLDL, calculated 03/07/2017 11  5 - 40 mg/dL Final  
 LDL, calculated 03/07/2017 96  0 - 99 mg/dL Final  
 Glucose 03/07/2017 88  65 - 99 mg/dL Final  
 BUN 03/07/2017 17  6 - 24 mg/dL Final  
 Creatinine 03/07/2017 0.75  0.57 - 1.00 mg/dL Final  
 GFR est non-AA 03/07/2017 99  >59 mL/min/1.73 Final  
 GFR est AA 03/07/2017 114  >59 mL/min/1.73 Final  
 BUN/Creatinine ratio 03/07/2017 23  9 - 23 Final  
 Sodium 03/07/2017 143  134 - 144 mmol/L Final  
 Potassium 03/07/2017 4.3  3.5 - 5.2 mmol/L Final  
 Chloride 03/07/2017 100  96 - 106 mmol/L Final  
 CO2 03/07/2017 25  18 - 29 mmol/L Final  
 Calcium 03/07/2017 9.2  8.7 - 10.2 mg/dL Final  
 Protein, total 03/07/2017 6.9  6.0 - 8.5 g/dL Final  
 Albumin 03/07/2017 4.5  3.5 - 5.5 g/dL Final  
 GLOBULIN, TOTAL 03/07/2017 2.4  1.5 - 4.5 g/dL Final  
 A-G Ratio 03/07/2017 1.9  1.1 - 2.5 Final  
 Comment: **Effective March 13, 2017 the reference interval** 
  for A/G Ratio will be changing to: Age                Male          Female 0 -  7 days       1.1 - 2.3       1.1 - 2.3 8 - 30 days       1.2 - 2.8       1.2 - 2.8 
          1 -  6 months     1.3 - 3.6       1.3 - 3.6 
   7 months -  5 years      1.5 - 2.6       1.5 - 2.6 
             > 5 years      1.2 - 2.2       1.2 - 2.2  Bilirubin, total 03/07/2017 0.4  0.0 - 1.2 mg/dL Final  
 Alk. phosphatase 03/07/2017 63  39 - 117 IU/L Final  
 AST (SGOT) 03/07/2017 17  0 - 40 IU/L Final  
 ALT (SGPT) 03/07/2017 23  0 - 32 IU/L Final  
 WBC 03/07/2017 5.9  3.4 - 10.8 x10E3/uL Final  
 RBC 03/07/2017 4.08  3.77 - 5.28 x10E6/uL Final  
 HGB 03/07/2017 13.1  11.1 - 15.9 g/dL Final  
 HCT 03/07/2017 39.1  34.0 - 46.6 % Final  
 MCV 03/07/2017 96  79 - 97 fL Final  
 MCH 03/07/2017 32.1  26.6 - 33.0 pg Final  
 MCHC 03/07/2017 33.5  31.5 - 35.7 g/dL Final  
 RDW 03/07/2017 12.7  12.3 - 15.4 % Final  
 PLATELET 24/56/0493 224  150 - 379 x10E3/uL Final  
 NEUTROPHILS 03/07/2017 49  % Final  
 Lymphocytes 03/07/2017 41  % Final  
 MONOCYTES 03/07/2017 8  % Final  
 EOSINOPHILS 03/07/2017 1  % Final  
 BASOPHILS 03/07/2017 1  % Final  
 ABS. NEUTROPHILS 03/07/2017 2.9  1.4 - 7.0 x10E3/uL Final  
 Abs Lymphocytes 03/07/2017 2.4  0.7 - 3.1 x10E3/uL Final  
 ABS. MONOCYTES 03/07/2017 0.4  0.1 - 0.9 x10E3/uL Final  
 ABS. EOSINOPHILS 03/07/2017 0.1  0.0 - 0.4 x10E3/uL Final  
 ABS. BASOPHILS 03/07/2017 0.0  0.0 - 0.2 x10E3/uL Final  
 IMMATURE GRANULOCYTES 03/07/2017 0  % Final  
 ABS. IMM. GRANS. 03/07/2017 0.0  0.0 - 0.1 x10E3/uL Final  
 TSH 03/07/2017 1.720  0.450 - 4.500 uIU/mL Final  
 T4, Free 03/07/2017 1.35  0.82 - 1.77 ng/dL Final  
 Immunoglobulin A, Qt. 03/07/2017 255  87 - 352 mg/dL Final  
 Deamidated Gliadin Ab, IgA 03/07/2017 3  0 - 19 units Final  
 Comment:                    Negative                   0 - 19 Weak Positive             20 - 30                    Moderate to Strong Positive   >30 
  
 Deamidated Gliadin Ab, IgG 03/07/2017 5  0 - 19 units Final  
 Comment:                    Negative                   0 - 19 Weak Positive             20 - 30 Moderate to Strong Positive   >30 
  
 t-Transglutaminase, IgA 03/07/2017 <2  0 - 3 U/mL Final  
 Comment:                               Negative        0 -  3 Weak Positive   4 - 10 Positive           >10 Tissue Transglutaminase (tTG) has been identified 
 as the endomysial antigen. Studies have demonstr- 
 ated that endomysial IgA antibodies have over 99% 
 specificity for gluten sensitive enteropathy.  t-Transglutaminase, IgG 03/07/2017 <2  0 - 5 U/mL Final  
 Comment:                               Negative        0 - 5 Weak Positive   6 - 9 Positive           >9 
  
 HIV SCREEN 4TH GENERATION WRFX 03/07/2017 Non Reactive  Non Reactive Final  
 GENETIC COUNSELOR 28/32/9545 Not applicable   Final  
 CLIENT SPECIMEN ID [id number] Not applicable   Final  
 SPECIMEN TYPE 03/07/2017 Comment   Final  
 Peripheral Blood  SPECIMEN(S) RECEIVED 03/07/2017 Comment   Final  
 1 - Yellow (ACD) 10 ml round bottom tube(s)  CLINICAL DATA 03/07/2017 Comment   Final  
 Not Provided  ETHNICITY 03/07/2017 Comment   Final  
 Not Provided  RESULTS 03/07/2017 Comment   Final  
 SMN1 copy number:  2 (Reduced Carrier Risk)  INTERPRETATION 03/07/2017 Note   Final  
 Comment: This individual has an SMN1 copy number of two. This result 
reduces but does not eliminate the risk to be a carrier of 
SMA. Information regarding clinical indication may provide 
a more detailed interpretation.  
  
 COMMENTS 03/07/2017 Note   Final  
 Comment: Spinal muscular atrophy (SMA) is an autosomal recessive 
disease of variable age of onset and severity caused by 
mutations (most often deletions or gene conversions) in the 
 survival motor neuron (SMN1) gene. Molecular testing 
assesses the number of copies of the SMN1 gene. Individuals 
with one copy of the SMN1 gene are predicted to be carriers 
of SMA. Individuals with two or more copies have a reduced 
risk to be carriers. (Affected individuals have 0 copies of 
the SMN1 gene.)    This copy number analysis cannot detect 
individuals who are carriers of SMA as a result of either 2 (or very rarely 3) copies of the SMN1 gene on one chromosome 
and the absence of the SMN1 gene on the other chromosome or 
small intragenic mutations within the SMN1 gene. This 
analysis also will not detect germline mosaicism or 
mutations in genes other than SMN1. Additionally, de minerva 
mutations have been reported in approximately 2% of SMA patients.  CARRIER DETECTION RATE 03/07/2017 Note   Final  
 Comment: Carrier Frequency and Risk Reductions for Individuals with No Family History of SMA Reduced   Reduced Carrier   Carrier Prior    Risk for  Risk for Detection  Carrier  2 copy    3 copy Ethnicity         rate(1)    Risk(1)  result    result          94.8%      1:47     1:834     1:5,600 Ashkenazi Druze  90.5%      1:67     1:611     1:5,400              93.3%      1:59     1:806     1:5,600           90.0%      1:68     1:579     1:5,400   70.5%      1:72     1:130     1:4,200  [place] [facility]      90.2%      1:52     1:443     1:5,400  METHOD/LIMITATIONS 03/07/2017 Note   Final  
 Comment: METHOD/LIMITATIONS:  Specimen DNA is isolated and amplified 
by real-time polymerase chain reaction (PCR) for exon 7 of 
the SMN1 gene and the internal standard reference genes. A 
mathematical algorithm is used to calculate and report SMN1 
copy numbers of 0, 1, 2 and 3.  Based upon this analysis, an 
 upper limit of 3 represents the highest degree of accuracy 
in reporting SMN1 copy number with statistical confidence. Sequencing of the primer and probe binding sites is 
performed on all fetal samples and samples with one copy of 
SMN1 by real-time PCR to rule out the presence of sequence 
variants which could interfere with analysis and 
interpretation. False positive or negative results may 
occur for reasons that include genetic variants, blood 
transfusions, bone marrow transplantation, erroneous 
representation of family relationships or contamination of a 
fetal sample with maternal cells.  REFERENCES 2017 Note   Final  
 Comment: REFERENCES: 1. Michelle HERNANDES, Mick N, Terra H, et al. 
Pan-ethnic carrier screening and prenatal diagnosis for 
spinal muscular atrophy: clinical laboratory analysis of 
>72,400 specimens. Eur J Hum Liza 2012; 20:27-32. 2.  TW, et al. Technical standards and guidelines for spinal 
muscular atrophy testing. Liza Med 2011; 13(7): I9183422.  DISCLAIMER 2017 Note   Final  
 Comment: The test was developed and its performance characteristics 
have been determined by [facility]. The laboratory is regulated under the Clinical Laboratory Improvement Amendments of 1988 (CLIA) as qualified to 
perform high complexity clinical testing. This test must be 
used in conjunction with clinical assessment, when 
available. [facility] is a business unit of 
[employer], a wholly-owned 
subsidiary of [employer].  ELECTRONICALLY SIGNED BY 2017 Comment   Final  
 Félix Kohli, Ph.D. [facility]  
 Cystic fibrosis screen 2017 Comment   Final  
 Molecular analysis report has been mailed.  Comment 2017 Comment   Final  
 Comment: The assay provides information intended to be used for carrier 
screening in adults of reproductive age, as an aid in  screening, and as a confirmatory test for another medically 
established diagnosis in newborns and children. The test is not 
indicated for use in fetal diagnostic testing, pre-implantation 
screening, or for any stand-alone diagnostic purposes without 
confirmation by another medically established diagnostic product 
or procedure.  Hgb Solubility 03/07/2017 Negative  Negative Final  
 Hemoglobin F 03/07/2017 0.0  0.0 - 2.0 % Final  
 Hemoglobin A 03/07/2017 98.0  94.0 - 98.0 % Final  
 Hemoglobin S 03/07/2017 0.0  0.0 % Final  
 Hemoglobin C 03/07/2017 0.0  0.0 % Final  
 Hemoglobin A2 03/07/2017 2.0  0.7 - 3.1 % Final  
 Interpretation 03/07/2017 Comment   Final  
 Normal adult hemoglobin present.  INTERPRETATION 03/07/2017 Note   Final  
 Supplement report is available. Assessment/ Plan:  
Diagnoses and all orders for this visit: 1. Peripheral polyneuropathy 
-     SED RATE (ESR) 
-     HEAVY METALS PROFILE I, BLOOD 
-     XR SPINE THORAC 3 V; Future -     XR SPINE LUMB 2 OR 3 V; Future [facility] 2. Postural dizziness with presyncope -     SED RATE (ESR) 
-     HEAVY METALS PROFILE I, BLOOD 
-     [facility] ref [facility] 3. Heavy sensation of lower extremity 
-     SED RATE (ESR) 
-     HEAVY METALS PROFILE I, BLOOD 
-     XR SPINE THORAC 3 V; Future -     XR SPINE LUMB 2 OR 3 V; Future [facility] ref [facility] 4. Curvature of spine -     XR SPINE THORAC 3 V; Future Constellation of neurologic and vestibular symptoms. Suspect patient has 2 different conditions occurring. Suspect organic neurologic/spinal abnormality in addition to BPPV. Referral to neurology for clarification of diagnosis. Alternatively may be conversion of stress as patient is a new parent. Will get sed rate and heavy metals to further workup peripheral neuropathy.   Will attempt to get records from [facility] to include discharge summary and lab work results. Will get x-ray of spine since no may be mild thoracic curvature with right shoulder lower than left and eval for any obvious spinal stenosis/bony lesion. Will defer further spinal imaging to neurology. Symptoms mild currently. Continue symptomatic treatment with meclizine as needed but may stop if finding this is consistently not improving symptoms. If all workup negative would get PHQ/TRENT to workup mood disorder. Educated patient on red flag symptoms to warrant return to clinic or emergency room visit. I have discussed the diagnosis with the patient and the intended plan as seen in the above orders. The patient has been offered or received an after-visit summary and questions were answered concerning future plans. I have discussed medication side effects and warnings with the patient as well. Follow-up Disposition: 
Return in about 3 months (around 12/17/2018) for Follow Up. Signed, Shantelle Woodson MD 
9/17/2018

## 2018-09-17 NOTE — PROGRESS NOTES
Chief Complaint Patient presents with  
[facility] Follow Up [facility] ER for dizziness. 1. Have you been to the ER, urgent care clinic since your last visit? Hospitalized since your last visit? Yes When: See Note above. 2. Have you seen or consulted any other health care providers outside of the [facility] since your last visit? Include any pap smears or colon screening.  No

## 2018-09-19 LAB
ARSENIC BLD-MCNC: 6 UG/L (ref 2–23)
ERYTHROCYTE [SEDIMENTATION RATE] IN BLOOD BY WESTERGREN METHOD: 8 MM/HR (ref 0–32)
LEAD BLD-MCNC: NORMAL UG/DL (ref 0–4)
MERCURY BLD-MCNC: NORMAL UG/L (ref 0–14.9)

## 2018-09-19 NOTE — PROGRESS NOTES
Notify Patient:  
 
Your test for inflammation and heavy metals was negative. I am still awaiting results form [facility] to review. Follow up with neurologist as planned.

## 2018-09-20 ENCOUNTER — OFFICE VISIT (OUTPATIENT)
Dept: NEUROLOGY | Age: 43
End: 2018-09-20

## 2018-09-20 VITALS
DIASTOLIC BLOOD PRESSURE: 80 MMHG | OXYGEN SATURATION: 99 % | HEIGHT: 69 IN | RESPIRATION RATE: 14 BRPM | SYSTOLIC BLOOD PRESSURE: 130 MMHG | BODY MASS INDEX: 23.55 KG/M2 | HEART RATE: 76 BPM | WEIGHT: 159 LBS

## 2018-09-20 DIAGNOSIS — R42 LIGHTHEADED: Primary | ICD-10-CM

## 2018-09-20 DIAGNOSIS — R55 NEAR SYNCOPE: ICD-10-CM

## 2018-09-20 NOTE — PROGRESS NOTES
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

## 2018-09-20 NOTE — PROGRESS NOTES
Chief Complaint Patient presents with  Dizziness  Neuropathy HISTORY OF PRESENT ILLNESS Navdeep Perry is a 39 y.o. female who has had multiple medical evaluations within the past 2-3 weeks. She is a new parent and has a 1month-old baby. She has been under stress and somewhat sleep deprived. She has had multiple near syncopal events over the past 3 weeks and during 1 of these she dropped her baby on the floor while she just collapsed into the chair. This was the first episode that occurred on September 1. She says that she may be out only for a few secondsand then is quickly back to her normal self. The episodes have occurred randomly and at different places and at one time it occurred while she was at a [employer] store with her . That episode was associated with a headache. Then she went to [place] for a conference and it occurred while she went to a grocery store with her . She felt severely lightheaded and her legs were wobbly. She went to a local hospital and had an extensive neurological workup and was also evaluated by a neurologist.  She had MRI of the brain, MRA of the head and neck and MRI of the cervical spine which was negative. She has had orthostatic blood pressures checked at least twice and these were negative. Her blood pressure actually rises when she stands up. Other routine labs, telemetry monitoring, cardiac workup was negative as well. No hearing loss or ringing sounds in ears. No disequilibrium or focal motor or sensory deficits. She has had difficulty with visual focusing at times and as if her eyes are moving. At some point she was told that she had a positive Wakeeney-Hallpike testing and had nystagmus. During one of her episodes she also developed numbness and a heavy feeling in her extremities, more so on the right. She has had nausea with some of her episodes. She was tried on meclizine which made her symptoms worse. Past medical history includes epidural 3 months ago followed by residual back pain that continued for about a week. She had some gait instability for almost a month after delivery. She is a  Past Medical History:  
Diagnosis Date  Headache Current Outpatient Prescriptions Medication Sig  
 meclizine (ANTIVERT) 12.5 mg tablet Take  by mouth three (3) times daily as needed.  naproxen sodium (ALEVE) 220 mg tablet Take 220 mg by mouth two (2) times daily (with meals).  cetirizine (ZYRTEC) 10 mg tablet Take 10 mg by mouth daily as needed for Allergies.  multivitamin (ONE A DAY) tablet Take 1 Tab by mouth daily.  B.infantis-B.ani-B.long-B.bifi (PROBIOTIC 4X) 10-15 mg TbEC Take  by mouth daily.  omega-3s-dha-epa-fish oil (OMEGA 3) 350-400 mg cap Take  by mouth daily. No current facility-administered medications for this visit. Allergies Allergen Reactions  Crab Swelling Face swelling Family History Problem Relation Age of Onset  Elevated Lipids Mother  Elevated Lipids Brother Social History Substance Use Topics  Smoking status: Never Smoker  Smokeless tobacco: Never Used  Alcohol use No  
   Comment: makes her feel hot and sick Past Surgical History:  
Procedure Laterality Date  HX  SECTION  2018  HX WISDOM TEETH EXTRACTION    
 
 
 
REVIEW OF SYSTEMS Review of Systems - History obtained from the patient Psychological ROS: negative ENT ROS: negative Hematological and Lymphatic ROS: negative Endocrine ROS: negative Respiratory ROS: no cough, shortness of breath, or wheezing Cardiovascular ROS: no chest pain or dyspnea on exertion Gastrointestinal ROS: no abdominal pain, change in bowel habits, or black or bloody stools Genito-Urinary ROS: no dysuria, trouble voiding, or hematuria Musculoskeletal ROS: negative Dermatological ROS: negative PHYSICAL EXAMINATION:   
Visit Vitals  /80  Pulse 76  Resp 14  
 Ht 5' 9\" (1.753 m)  Wt 72.1 kg (159 lb)  SpO2 99%  BMI 23.48 kg/m2 General:  Well defined, nourished, and groomed individual in no acute distress. Neck: Supple, nontender, no bruits, no pain with resistance to active range of motion. Heart: Regular rate and rhythm, no murmurs, rub, or gallop. Normal S1S2. Lungs:  Clear to auscultation bilaterally with equal chest expansion, no cough, no wheeze Musculoskeletal:  Extremities revealed no edema and had full range of motion of joints. Psych:  Good mood and bright affect NEUROLOGICAL EXAMINATION:    
Mental Status:   Alert and oriented to person, place, and time with recent and remote memory intact. Attention span and concentration are normal. Speech is fluent with a full fund of knowledge. Cranial Nerves:   
II, III, IV, VI:  Visual acuity grossly intact. Visual fields are normal.   
Pupils are equal, round, and reactive to light and accommodation. Extra-ocular movements are full and fluid. Fundoscopic exam was benign, no ptosis or nystagmus. V-XII: Hearing is grossly intact. Facial features are symmetric, with normal sensation and strength. The palate rises symmetrically and the tongue protrudes midline. Sternocleidomastoids 5/5. Motor Examination: Normal tone, bulk, and strength. 5/5 muscle strength throughout. No cogwheel rigidity or clonus present. Sensory exam:  Normal throughout to pinprick, temperature, and vibration sense. Normal proprioception. Coordination:  Heel-to-shin was smooth and symmetrical bilaterally. Finger to nose and rapid arm movement testing was normal.   No resting or intention tremor Gait and Station:  Steady while walking on toes, heels, and with tandem walking. Normal arm swing. No Rhomberg or pronator drift. No muscle wasting or fasiculations noted. Reflexes:  DTRs 2+ throughout. Toes downgoing.    
 
 
Waylan Dove / IMAGING 
 Old records and imaging reports were reviewed ASSESSMENT 
  ICD-10-CM ICD-9-CM 1. Lightheaded R42 780.4 EEG 2. Near syncope R55 780.2 EEG  
 
 
DISCUSSION Ms. Daren An has had multiple episodes of near syncope associated with lightheadedness, occasionally with nausea and headache over the past 2-3 weeks. She has had an extensive workup in this regard including evaluation of intra-and extra cranial circulation, MRI of the brain and cervical spine which was all negative. Her neurological exam is nonfocal and not suggestive of a peripheral neuropathic process. Orthostatic blood pressures were negative which would argue against dysautonomia. Tilt table testing can be considered if the problem persists. Other possibilities include the basilar migraine, vasovagal phenomena or a primary otological issue. I have recommended an ENT evaluation as the next step I doubt that she is having a type of epileptic seizure but will check EEG to complete workup Follow-up if she is not better after 3-4 weeks Thank you for allowing me to participate in the care of Ms. Anthony Morgan. Please feel free to contact me if you have any questions. I will be happy to follow to follow her along with you. Shannan Montelongo MD 
Diplomate, American Board of Psychiatry & Neurology (Neurology) Alondra Junior Board of Psychiatry & Neurology (Clinical Neurophysiology) Diplomate, [address] [place] [address] Board of Electrodiagnostic Medicine This note will not be viewable in [address]

## 2018-09-20 NOTE — LETTER
9/20/2018 5:26 PM 
 
Patient:  Malena Monte YOB: 1975 Date of Visit: 9/20/2018 Dear Michael Wilhelm MD 
[facility] [place] [facility] [address] VIA In Basket 
 : Thank you for referring Ms. Malena Monte to me for evaluation/treatment. Below are the relevant portions of my assessment and plan of care. If you have questions, please do not hesitate to call me. I look forward to following Ms. Jay Rodriguez along with you. Sincerely, Aga Dubois MD

## 2018-09-20 NOTE — MR AVS SNAPSHOT
[place] [address] [address] [place] [address] 
[phone] Patient: Hammad Caldera MRN: [id number] EQY:[birth year] Visit Information Date & Time Provider Department Dept. Phone Encounter #  
 9/20/2018  2:00 PM Ashley Smith MD [facility] at [address] [id number] Follow-up Instructions Return if symptoms worsen or fail to improve. Upcoming Health Maintenance Date Due  
 PAP AKA CERVICAL CYTOLOGY 9/3/1995 Influenza Age 5 to Adult 8/1/2018 DTaP/Tdap/Td series (2 - Td) 9/1/2021 Allergies as of 9/20/2018  Review Complete On: 9/20/2018 By: Ashley Smith MD  
  
 Severity Noted Reaction Type Reactions Crab  09/17/2018    Swelling Face swelling Current Immunizations  Reviewed on 9/14/2018 Name Date Influenza Vaccine 3/7/2017 Not reviewed this visit You Were Diagnosed With   
  
 Codes Comments Lightheaded    -  Primary ICD-10-CM: Y21 ICD-9-CM: 780.4 Near syncope     ICD-10-CM: R55 
ICD-9-CM: 780. 2 Vitals BP Pulse Resp Height(growth percentile) Weight(growth percentile) LMP  
 130/80 76 14 5' 9\" (1.753 m) 159 lb (72.1 kg) 09/02/2018 (Exact Date) SpO2 BMI OB Status Smoking Status 99% 23.48 kg/m2 Having regular periods Never Smoker Vitals History BMI and BSA Data Body Mass Index Body Surface Area  
 23.48 kg/m 2 1.87 m 2 Preferred Pharmacy [facility] Name Phone [address] [phone] Your Updated Medication List  
  
   
This list is accurate as of 9/20/18  2:44 PM.  Always use your most recent med list.  
  
  
  
  
 ALEVE 220 mg tablet Generic drug:  naproxen sodium Take 220 mg by mouth two (2) times daily (with meals). cetirizine 10 mg tablet Commonly known as:  ZYRTEC  
 Take 10 mg by mouth daily as needed for Allergies. meclizine 12.5 mg tablet Commonly known as:  ANTIVERT Take  by mouth three (3) times daily as needed. multivitamin tablet Commonly known as:  ONE A DAY Take 1 Tab by mouth daily. OMEGA 3 350-400 mg Cap Generic drug:  omega-3s-dha-epa-fish oil Take  by mouth daily. PROBIOTIC 4X 10-15 mg Tbec Generic drug:  B.infantis-B.ani-B.long-B.bifi Take  by mouth daily. Follow-up Instructions Return if symptoms worsen or fail to improve. To-Do List   
 09/24/2018 Neurology:  EEG Introducing [facility]! Dear Sonal Douglass: Thank you for requesting a [employer] account. Our records indicate that you already have an active [employer] account. You can access your account anytime at [email or web address] Did you know that you can access your hospital and ER discharge instructions at any time in [employer]? You can also review all of your test results from your hospital stay or ER visit. Additional Information If you have questions, please visit the Frequently Asked Questions section of the [employer] website at [email or web address] Remember, [employer] is NOT to be used for urgent needs. For medical emergencies, dial 911. Now available from your iPhone and Android! Please provide this summary of care documentation to your next provider. Your primary care clinician is listed as Ashlyn Begum. If you have any questions after today's visit, please call [phone].

## 2018-09-24 ENCOUNTER — TELEPHONE (OUTPATIENT)
Dept: FAMILY MEDICINE CLINIC | Age: 43
End: 2018-09-24

## 2018-09-24 NOTE — TELEPHONE ENCOUNTER
Outbound call to patient. Name and  verified. Notified patient that medical records were received from [facility] and everything looked normal. Patient appreciative of call. She has already seen neurology.

## 2018-10-02 ENCOUNTER — PATIENT MESSAGE (OUTPATIENT)
Dept: FAMILY MEDICINE CLINIC | Age: 43
End: 2018-10-02

## 2018-10-02 DIAGNOSIS — R42 DIZZINESS: Primary | ICD-10-CM

## 2018-10-02 NOTE — TELEPHONE ENCOUNTER
Caleb Genao LPN 01/1/0286 3:16 PM EDT       ----- Message -----   From: Ursula Jones   Sent: 10/2/2018 9:53 AM   To: [facility] [place]  Subject: Referral Request     Hi Dr Storm Pantoja! I saw Dr. Kathleen Galeano a few weeks ago for vestibular issues and she was great! She referred me to a neurologist who didn't really have any advise but did set me up for an EEG this friday. I'm not convinced an EEG is the way to go, and he wasn't really either but will likely do it anyway. I'm still having daily dizzy issues-pretty much persistent throughout the day with intermittent worsening. I suspect it is a vestibulopathy or maybe even menieres (sp?) as it does run in my family. I was wondering if you could submit for a referral to an EENT to see about testing for anything related to that for me. let me know if that is possible.  Thanks Ursula Jones

## 2018-10-02 NOTE — LETTER
10/2/2018 1:20 PM 
 
RE:    Tre Burnett [address] [place] [id number] Dear Dr. Wei Ha, Thank you for agreeing to see Mian Humphries I am referring my patient to you for evaluation of dizziness for several weeks. She saw neurology Dr. Mis Peck who didn't find anything wrong and recommended she see ENT. The dizziness seems to have been light-headedness more than vertigo. She does note that several family members have meneires. Thanks for your help! I appreciate your assistance in Ms. Monk's care  and look forward to your findings and recommendations. Sincerely, Margot Ron MD

## 2018-10-05 ENCOUNTER — HOSPITAL ENCOUNTER (OUTPATIENT)
Dept: NEUROLOGY | Age: 43
Discharge: HOME | End: 2018-10-05
Attending: PSYCHIATRY & NEUROLOGY
Payer: COMMERCIAL

## 2018-10-05 DIAGNOSIS — R42 LIGHTHEADED: ICD-10-CM

## 2018-10-05 DIAGNOSIS — R55 NEAR SYNCOPE: ICD-10-CM

## 2018-10-05 PROCEDURE — 95816 EEG AWAKE AND DROWSY: CPT

## 2018-10-19 NOTE — PROCEDURES
[address] [facility]  EEG    [facility]  MR#: [id number]  : 1975  ACCOUNT #: [de-identified]   DATE OF SERVICE: 10/05/2018    REFERRING PHYSICIAN:  Jones Muñoz MD    HISTORY:  The patient is a 41-year-old female who is being evaluated for episodes of syncope. DESCRIPTION:  This is an 18-channel EEG performed on an awake patient. The dominant posterior background rhythm consists of symmetric, well-modulated, medium-voltage rhythms in the 8-9 Hz frequency range out of the posterior head region. Eye blink artifact along with slower theta frequencies were seen in the frontal head region. Drowsiness and sleep states were not recorded. Photic stimulation elicits a symmetric driving response. Hyperventilation produced normal physiological response. EEG SUMMARY:  Normal study. CLINICAL INTERPRETATION:  This was a normal EEG during wakeful state of the patient. No lateralizing or epileptiform features were noted.       Olman Penny MD       ASS / DN  D: 10/19/2018 09:46     T: 10/19/2018 16:10  JOB #: [id number]

## 2018-10-23 ENCOUNTER — TELEPHONE (OUTPATIENT)
Dept: NEUROLOGY | Age: 43
End: 2018-10-23

## 2018-10-23 NOTE — TELEPHONE ENCOUNTER
----- Message from Bear Ferrara MD sent at 10/23/2018  9:20 AM EDT -----  EEG was normal.  Please inform

## 2018-10-30 ENCOUNTER — TELEPHONE (OUTPATIENT)
Dept: NEUROLOGY | Age: 43
End: 2018-10-30

## 2018-11-05 DIAGNOSIS — G43.909 MIGRAINE WITHOUT STATUS MIGRAINOSUS, NOT INTRACTABLE, UNSPECIFIED MIGRAINE TYPE: Primary | ICD-10-CM

## 2018-11-05 RX ORDER — AMITRIPTYLINE HYDROCHLORIDE 10 MG/1
TABLET, FILM COATED ORAL
Qty: 60 TAB | Refills: 2 | Status: SHIPPED | OUTPATIENT
Start: 2018-11-05 | End: 2018-11-08 | Stop reason: SDUPTHER

## 2018-11-08 RX ORDER — AMITRIPTYLINE HYDROCHLORIDE 10 MG/1
TABLET, FILM COATED ORAL
Qty: 60 TAB | Refills: 2 | Status: SHIPPED | OUTPATIENT
Start: 2018-11-08 | End: 2019-11-26 | Stop reason: DRUGHIGH

## 2019-11-26 ENCOUNTER — OFFICE VISIT (OUTPATIENT)
Dept: FAMILY MEDICINE CLINIC | Age: 44
End: 2019-11-26

## 2019-11-26 VITALS
SYSTOLIC BLOOD PRESSURE: 128 MMHG | DIASTOLIC BLOOD PRESSURE: 83 MMHG | HEART RATE: 80 BPM | BODY MASS INDEX: 21.77 KG/M2 | HEIGHT: 69 IN | WEIGHT: 147 LBS | RESPIRATION RATE: 19 BRPM | TEMPERATURE: 98 F | OXYGEN SATURATION: 100 %

## 2019-11-26 DIAGNOSIS — R59.0 CERVICAL LYMPHADENOPATHY: ICD-10-CM

## 2019-11-26 DIAGNOSIS — G25.0 ESSENTIAL TREMOR: ICD-10-CM

## 2019-11-26 DIAGNOSIS — G43.809 VESTIBULAR MIGRAINE: Primary | ICD-10-CM

## 2019-11-26 RX ORDER — ACETAMINOPHEN AND CODEINE PHOSPHATE 120; 12 MG/5ML; MG/5ML
SOLUTION ORAL
Refills: 4 | COMMUNITY
Start: 2019-10-06

## 2019-11-26 RX ORDER — AMITRIPTYLINE HYDROCHLORIDE 25 MG/1
25 TABLET, FILM COATED ORAL
COMMUNITY
Start: 2019-11-26 | End: 2019-11-26 | Stop reason: SINTOL

## 2019-11-26 RX ORDER — NADOLOL 20 MG/1
20 TABLET ORAL
Qty: 90 TAB | Refills: 3 | Status: SHIPPED | OUTPATIENT
Start: 2019-11-26 | End: 2019-12-19

## 2019-11-26 RX ORDER — NORTRIPTYLINE HYDROCHLORIDE 25 MG/1
25 CAPSULE ORAL
Qty: 90 CAP | Refills: 3 | Status: SHIPPED | OUTPATIENT
Start: 2019-11-26 | End: 2020-10-20 | Stop reason: SDUPTHER

## 2019-11-26 NOTE — PATIENT INSTRUCTIONS
Change amitriptyline to nortriptyline, see if that helps your grogginess and works as well for your headache/vertigo prevention    After a week, add nadolol 20mg at bedtime for better control    Continued stress management is good for prevention as well, exercise    Other preventives we could consider: verapamil, topirimate, lyrica/gabapentin, depakote, Aimovig, botox             Deciding About Taking Medicine to Prevent Migraines  How can you decide about taking medicine to prevent migraine headaches? What are migraines? Migraines are painful, throbbing headaches. They can last from 4 to 72 hours. They often occur on only one side of your head. But you may feel them on both sides. The pain may keep you from doing your daily activities. You may take a daily medicine if you get bad migraines often. This can help prevent them. What are key points about this decision? · Medicines to prevent migraines may not stop them every time. But if you take them daily, you can reduce how many migraines you get by more than half. They can also reduce how long migraines last. And your symptoms may not be as bad. · Medicines that prevent migraines may cause side effects. You may have sleep and memory problems, upset stomach, dry mouth, or constipation. Some of these side effects may last for as long as you take the medicine. Or they may go away within a few weeks. Why might you choose to take medicine to prevent migraines? · You are willing to take medicine daily if it will help your symptoms. · You don't think the side effects of the medicine could be as bad as your migraine symptoms. · Your migraines get in the way of your work. Or they harm your relationships with friends and family. · Benefits of medicine include fewer or no migraines. And your migraines may not last as long or feel as bad. Why might you choose not to take medicine to prevent migraines? · You want to avoid the side effects of the medicine.   · You don't want to take medicine every day. · Your migraines are not affecting your work and relationships. · If your symptoms don't improve with home treatment and other medicines, you can decide later to take medicine every day to help prevent migraines. Your decision  Thinking about the facts and your feelings can help you make a decision that is right for you. Be sure you understand the benefits and risks of your options, and think about what else you need to do before you make the decision. Where can you learn more? Go to http://amada-josh.info/. Enter F622 in the search box to learn more about \"Deciding About Taking Medicine to Prevent Migraines. \"  Current as of: March 28, 2019  Content Version: 12.2  © 3996-1775 [employer]. Care instructions adapted under license by [employer] (which disclaims liability or warranty for this information). If you have questions about a medical condition or this instruction, always ask your healthcare professional. [place] [address] any warranty or liability for your use of this information.

## 2019-11-26 NOTE — PROGRESS NOTES
Hao Rascon [address] [facility]. [place], [address]  [phone]             Date of visit: 11/26/2019   Subjective:      History obtained from:  the patient. Rigoberto Mak is a 38 y.o. female who presents today for follow up vestibular migraine, was diagnosed last year  Dizzy feeling, hard to describe, light-headed, like ground being taken out from under her  When it first started happening in Aug 2018 (had work up and saw neurologist at [facility])  Was much worse then, is better now    This all started 2 days after going back to work after maternity leave  Had just had the Mirena put it and thought maybe that was it. Went to [facility] ER on 9/3/18, had CT which was fine  Then saw ob/gyn and had the mirena removed  Went to [place] for a [employer] and while there had severe pain in right neck, vision went black, felt like she was being pulled backwards and down when trying to walk  Had to go to ER at [facility]. Got MRI, saw neurology resident  Admitted her, did ID testing, bloodwork, echo, vascular scan, telemetry, etc  Didn't find anything wrong  No one even brought up vestibular migraine  Saw ENT Dr. Sonia Ruiz, told her to call back if not better in a month  Was getting anxious and that made symptoms worse  Did vestibular work up with ENT, everything normal.   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  Tried it again recently, got worse so going back off that  Came off gluten as well  Does exercise peloton every day and she feels better after getting off the bike    Had regular migraines in the past, mostly hormonal  That is one reason they put the mirena when her period came back    Had vestibular attack age 21    Wants to make sure she doesn't need bloodwork    Back on micronor for long periods and irregular periods and birth control  Last period lasted over 30 days  No matter what she takes she tends to spot. Not nursing, that didn't go well for her and that was stressful too. Has appt for Giovanni with new ob/gyn at [facility]    Not having hot flashes. Pregnancy was rough, did IVF, lots of hormones    Does have beta-blockers to use prn if migraine started  Or for familial tremors on a prn basis, propranolol 10mg  Doesn't like the propranolol because she feels like a rebound effect. Doesn't like that the amitriptyline really knocks her out  Lasts 12 hours. Gets up at 7 and is a struggle to get up. Once she gets up is ok. Gets the painful migriane right side of head around time of period  Uses aleve mostly around the time of her period    Some left neck pain from carrying daughter on that side  Thinks lymph node there as well  Another more anterior neck since she got sick with daughters illness last year    Patient Active Problem List    Diagnosis Date Noted    Vestibular migraine 11/26/2019    Essential tremor 11/26/2019    Seasonal allergic rhinitis due to pollen 03/07/2017    Irritable bowel syndrome with both constipation and diarrhea 03/07/2017    Lactose intolerance 03/07/2017    Gluten intolerance 03/07/2017    Family history of hyperlipidemia 03/07/2017     Current Outpatient Medications   Medication Sig Dispense Refill    norethindrone (MICRONOR) 0.35 mg tab TAKE 1 TABLET BY MOUTH EVERY DAY  4    nadolol (CORGARD) 20 mg tablet Take 1 Tab by mouth nightly. 90 Tab 3    nortriptyline (PAMELOR) 25 mg capsule Take 1 Cap by mouth nightly. To prevent migraine 90 Cap 3    naproxen sodium (ALEVE) 220 mg tablet Take 220 mg by mouth two (2) times daily (with meals).  cetirizine (ZYRTEC) 10 mg tablet Take 10 mg by mouth daily as needed for Allergies.  multivitamin (ONE A DAY) tablet Take 1 Tab by mouth daily.       BMarcoinfantis-B.ani-B.long-Tushari (PROBIOTIC 4X) 10-15 mg TbEC Take  by mouth daily. Allergies   Allergen Reactions    Crab Swelling     Face swelling     Past Medical History:   Diagnosis Date    Headache      Past Surgical History:   Procedure Laterality Date    HX  SECTION  2018    HX WISDOM TEETH EXTRACTION       Family History   Problem Relation Age of Onset    Elevated Lipids Mother     Headache Father         not sure if migraine    Elevated Lipids Brother      Social History     Tobacco Use    Smoking status: Never Smoker    Smokeless tobacco: Never Used   Substance Use Topics    Alcohol use: No     Comment: makes her feel hot and sick      Social History     Patient does not qualify to have social determinant information on file (likely too young). 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 General: stated age, well developed, well nourished and in NAD  Neck: supple, symmetrical, trachea midline, no adenopathy and thyroid: not enlarged, symmetric, no tenderness/mass/nodules  Lungs:  clear to auscultation w/o rales, rhonchi, wheezes w/normal effort and no use of accessory muscles of respiration   Heart: regular rate and rhythm, S1, S2 normal, no murmur, click, rub or gallop  Neuro: CN 2-12 intact, strength 5/5, patellar reflexes 2+ bilaterally, sensation intact to light touch bilaterally, gait normal. No tremor noted today  Ext:  No edema noted.    Lymph: left submandibular/anterior chain area with approx 1.5cm nontender round firm mass consistent with lymph node, left posterior chain with approx 6mm mobile oval soft mass consistent with lymph noded, right anterior chain also near submandibular with approx 6mm nontender mobile oval mass consistent with lymph node  Skin:  Normal. and no rash or abnormalities   Psych: alert and oriented to person, place, time and situation and Speech: appropriate quality, quantity and organization of sentences      Assessment/Plan:       ICD-10-CM ICD-9-CM    1. Vestibular migraine G43.109 346.00    2. Cervical lymphadenopathy R59.0 785.6 US THYROID/PARATHYROID/SOFT TISS   3. Essential tremor G25.0 333.1         Orders Placed This Encounter    US THYROID/PARATHYROID/SOFT TISS    norethindrone (MICRONOR) 0.35 mg tab    DISCONTD: amitriptyline (ELAVIL) 25 mg tablet    nadolol (CORGARD) 20 mg tablet    nortriptyline (PAMELOR) 25 mg capsule       The amitriptyline is working pretty well although not completely  Also makes her groggy next morning  Will try switching to nortriptyline and adding daily beta-blocker  That should also keep her familial tremor controlled consistently    Not sure of cause of lymphadenopathy, one on left submandibular a little large and has been there for a year  Will ultrasound but doubt pathology  Patient Instructions     Change amitriptyline to nortriptyline, see if that helps your grogginess and works as well for your headache/vertigo prevention    After a week, add nadolol 20mg at bedtime for better control    Continued stress management is good for prevention as well, exercise    Other preventives we could consider: verapamil, topirimate, lyrica/gabapentin, depakote, Aimovig, botox    Discussed the diagnosis and plan and she expressed understanding. Follow-up and Dispositions    · Return in about 3 months (around 2/26/2020) for Follow up.          Sommer Gauthier MD

## 2019-11-26 NOTE — PROGRESS NOTES
Chief Complaint   Patient presents with    Migraine     follow up      1. Have you been to the ER, urgent care clinic since your last visit? Hospitalized since your last visit? No    2. Have you seen or consulted any other health care providers outside of the [address] [place] [address] since your last visit? Include any pap smears or colon screening.  No      Has pap scheduled for Jan.

## 2019-12-11 ENCOUNTER — HOSPITAL ENCOUNTER (OUTPATIENT)
Dept: ULTRASOUND IMAGING | Age: 44
Discharge: HOME | End: 2019-12-11
Attending: FAMILY MEDICINE
Payer: COMMERCIAL

## 2019-12-11 DIAGNOSIS — E04.1 THYROID NODULE: ICD-10-CM

## 2019-12-11 DIAGNOSIS — R59.0 CERVICAL LYMPHADENOPATHY: Primary | ICD-10-CM

## 2019-12-11 DIAGNOSIS — R59.0 CERVICAL LYMPHADENOPATHY: ICD-10-CM

## 2019-12-11 PROCEDURE — 76536 US EXAM OF HEAD AND NECK: CPT

## 2019-12-19 LAB
BASOPHILS # BLD AUTO: 0.1 X10E3/UL (ref 0–0.2)
BASOPHILS NFR BLD AUTO: 1 %
EOSINOPHIL # BLD AUTO: 0.1 X10E3/UL (ref 0–0.4)
EOSINOPHIL NFR BLD AUTO: 1 %
ERYTHROCYTE [DISTWIDTH] IN BLOOD BY AUTOMATED COUNT: 12.2 % (ref 12.3–15.4)
HCT VFR BLD AUTO: 42.5 % (ref 34–46.6)
HGB BLD-MCNC: 13.7 G/DL (ref 11.1–15.9)
IMM GRANULOCYTES # BLD AUTO: 0 X10E3/UL (ref 0–0.1)
IMM GRANULOCYTES NFR BLD AUTO: 0 %
LYMPHOCYTES # BLD AUTO: 1.7 X10E3/UL (ref 0.7–3.1)
LYMPHOCYTES NFR BLD AUTO: 27 %
MCH RBC QN AUTO: 31 PG (ref 26.6–33)
MCHC RBC AUTO-ENTMCNC: 32.2 G/DL (ref 31.5–35.7)
MCV RBC AUTO: 96 FL (ref 79–97)
MONOCYTES # BLD AUTO: 0.5 X10E3/UL (ref 0.1–0.9)
MONOCYTES NFR BLD AUTO: 8 %
NEUTROPHILS # BLD AUTO: 4 X10E3/UL (ref 1.4–7)
NEUTROPHILS NFR BLD AUTO: 63 %
PLATELET # BLD AUTO: 297 X10E3/UL (ref 150–450)
RBC # BLD AUTO: 4.42 X10E6/UL (ref 3.77–5.28)
T4 FREE SERPL-MCNC: 1.48 NG/DL (ref 0.82–1.77)
TSH SERPL DL<=0.005 MIU/L-ACNC: 0.73 UIU/ML (ref 0.45–4.5)
WBC # BLD AUTO: 6.4 X10E3/UL (ref 3.4–10.8)

## 2019-12-19 RX ORDER — NADOLOL 20 MG/1
10 TABLET ORAL
COMMUNITY
Start: 2019-12-19 | End: 2020-10-20 | Stop reason: SDUPTHER

## 2020-05-30 ENCOUNTER — PATIENT MESSAGE (OUTPATIENT)
Dept: FAMILY MEDICINE CLINIC | Age: 45
End: 2020-05-30

## 2020-05-30 DIAGNOSIS — E04.2 MULTIPLE THYROID NODULES: Primary | ICD-10-CM

## 2020-08-20 LAB — SARS-COV-2, NAA: NEGATIVE

## 2020-10-19 NOTE — PATIENT INSTRUCTIONS
Well Visit, Ages 25 to 48: Care Instructions Your Care Instructions Physical exams can help you stay healthy. Your doctor has checked your overall health and may have suggested ways to take good care of yourself. He or she also may have recommended tests. At home, you can help prevent illness with healthy eating, regular exercise, and other steps. Follow-up care is a key part of your treatment and safety. Be sure to make and go to all appointments, and call your doctor if you are having problems. It's also a good idea to know your test results and keep a list of the medicines you take. How can you care for yourself at home? · Reach and stay at a healthy weight. This will lower your risk for many problems, such as obesity, diabetes, heart disease, and high blood pressure. · Get at least 30 minutes of physical activity on most days of the week. Walking is a good choice. You also may want to do other activities, such as running, swimming, cycling, or playing tennis or team sports. Discuss any changes in your exercise program with your doctor. · Do not smoke or allow others to smoke around you. If you need help quitting, talk to your doctor about stop-smoking programs and medicines. These can increase your chances of quitting for good. · Talk to your doctor about whether you have any risk factors for sexually transmitted infections (STIs). Having one sex partner (who does not have STIs and does not have sex with anyone else) is a good way to avoid these infections. · Use birth control if you do not want to have children at this time. Talk with your doctor about the choices available and what might be best for you. · Protect your skin from too much sun. When you're outdoors from 10 a.m. to 4 p.m., stay in the shade or cover up with clothing and a hat with a wide brim. Wear sunglasses that block UV rays. Even when it's cloudy, put broad-spectrum sunscreen (SPF 30 or higher) on any exposed skin. · See a dentist one or two times a year for checkups and to have your teeth cleaned. · Wear a seat belt in the car. Follow your doctor's advice about when to have certain tests. These tests can spot problems early. For everyone · Cholesterol. Have the fat (cholesterol) in your blood tested after age 21. Your doctor will tell you how often to have this done based on your age, family history, or other things that can increase your risk for heart disease. · Blood pressure. Have your blood pressure checked during a routine doctor visit. Your doctor will tell you how often to check your blood pressure based on your age, your blood pressure results, and other factors. · Vision. Talk with your doctor about how often to have a glaucoma test. 
· Diabetes. Ask your doctor whether you should have tests for diabetes. · Colon cancer. Your risk for colorectal cancer gets higher as you get older. Some experts say that adults should start regular screening at age 48 and stop at age 76. Others say to start before age 48 or continue after age 76. Talk with your doctor about your risk and when to start and stop screening. For women · Breast exam and mammogram. Talk to your doctor about when you should have a clinical breast exam and a mammogram. Medical experts differ on whether and how often women under 50 should have these tests. Your doctor can help you decide what is right for you. · Cervical cancer screening test and pelvic exam. Begin with a Pap test at age 24. The test often is part of a pelvic exam. Starting at age 27, you may choose to have a Pap test, an HPV test, or both tests at the same time (called co-testing). Talk with your doctor about how often to have testing. · Tests for sexually transmitted infections (STIs). Ask whether you should have tests for STIs. You may be at risk if you have sex with more than one person, especially if your partners do not wear condoms. For men · Tests for sexually transmitted infections (STIs). Ask whether you should have tests for STIs. You may be at risk if you have sex with more than one person, especially if you do not wear a condom. · Testicular cancer exam. Ask your doctor whether you should check your testicles regularly. · Prostate exam. Talk to your doctor about whether you should have a blood test (called a PSA test) for prostate cancer. Experts differ on whether and when men should have this test. Some experts suggest it if you are older than 39 and are -American or have a father or brother who got prostate cancer when he was younger than 72. When should you call for help? Watch closely for changes in your health, and be sure to contact your doctor if you have any problems or symptoms that concern you. Where can you learn more? Go to http://www.sheikh.com/ Enter P072 in the search box to learn more about \"Well Visit, Ages 25 to 48: Care Instructions. \" Current as of: May 27, 2020               Content Version: 12.6 © 2006-2020 [employer]. Care instructions adapted under license by [employer] (which disclaims liability or warranty for this information). If you have questions about a medical condition or this instruction, always ask your healthcare professional. [address] any warranty or liability for your use of this information.

## 2020-10-19 NOTE — PROGRESS NOTES
Hao Rascon [facility]  [id number] [place] [address] [address]. [place], [address]  [phone]             Date of visit: 10/20/2020   Subjective:      History obtained from:  the patient.   Kaelyn Llanes is a 54 y.o. 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   Is working part time in person and likes that    Had a really bad migraine 1 month ago, lasted 5 days  Infrequent but needs more meds to try to break them  Aleve helps for 6-8 hours but doesn't break it  Has tried 440mg  excedrin doesn't work for her; caffeine makes her worse  Aleve is the best of the nsaids    If going to have a migraine always starts in right occiput  Hasn't made her vomit in a long time but used to    Using nadolol 10mg nightly  If her migraines are happening might use 15mg  20mg would make her too dizzy    Mostly her vestibuar symptoms are good, gets a little carsick at times or a little dizzy with certain movements  Worse with stress or caffeine    Nadolol also helps her essential tremor    We did a neck ultrasound last fall for lymphadenopathy and is due to repeat  Had a small thyroid nodule as well  Will ultrasound thyroid herself to recheck it soon    Allergies on zyrtec     No longer on mirena or micronor  The vestibular event was 1 week after getting mirena so they took it out and put her back on micronor  Her ob left so seeing someone new at [facility]  Was having constant spotting, was annoying  In between those would get significant swelling/discomfort in pelvic area between times  Always miserable with one or the other  Better off it, having a regular period every 25 days  Does still get swollen/uncomfortable between her periods  Migraines were worse when she was on estrogen  Last pap this summer  Had mammo as well    Eating well, feels better with migraine and GI tract when she eats well  Does exercise peloton almost every day  Also does yoga classes 2x per week    flu shot today  Colonoscopy not yet but might want after covid better      Patient Active Problem List    Diagnosis Date Noted    History of pregnancy induced hypertension 10/20/2020    History of gestational diabetes 10/20/2020    Multiple thyroid nodules 10/20/2020    Vestibular migraine 11/26/2019    Essential tremor 11/26/2019    Seasonal allergic rhinitis due to pollen 03/07/2017    Irritable bowel syndrome with both constipation and diarrhea 03/07/2017    Lactose intolerance 03/07/2017    Gluten intolerance 03/07/2017    Family history of hyperlipidemia 03/07/2017     Current Outpatient Medications   Medication Sig Dispense Refill    SUMAtriptan (IMITREX) 50 mg tablet Take 1 Tab by mouth two (2) times daily as needed for Migraine. Repeat after 1 hour if needed 9 Tab 12    promethazine (PHENERGAN) 25 mg tablet Take 1 Tab by mouth every six (6) hours as needed for Nausea (migraine). 30 Tab 1    naproxen (NAPROSYN) 500 mg tablet Take 1 Tab by mouth two (2) times daily as needed (migraine). 30 Tab 3    nadoloL (CORGARD) 20 mg tablet Take 0.5 Tabs by mouth nightly. 45 Tab 3    nortriptyline (PAMELOR) 25 mg capsule Take 1 Cap by mouth nightly. To prevent migraine 90 Cap 3    norethindrone (MICRONOR) 0.35 mg tab TAKE 1 TABLET BY MOUTH EVERY DAY  4    naproxen sodium (ALEVE) 220 mg tablet Take 220 mg by mouth two (2) times daily (with meals).  cetirizine (ZYRTEC) 10 mg tablet Take 10 mg by mouth daily as needed for Allergies.       multivitamin (ONE A DAY) tablet Take 1 Tab by mouth daily.  B.infantis-B.ani-B.long-B.bifi (PROBIOTIC 4X) 10-15 mg TbEC Take  by mouth daily. Allergies   Allergen Reactions    Crab Swelling     Face swelling     Past Medical History:   Diagnosis Date    Headache      Past Surgical History:   Procedure Laterality Date    HX  SECTION  2018    HX WISDOM TEETH EXTRACTION       Family History   Problem Relation Age of Onset    Elevated Lipids Mother    Jilda  Bladder Disease Mother     Headache Father         not sure if migraine    Elevated Lipids Brother      Social History     Tobacco Use    Smoking status: Never Smoker    Smokeless tobacco: Never Used   Substance Use Topics    Alcohol use: No     Comment: makes her feel hot and sick      Social History     Social History Narrative    , 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      General: stated age, well-developed, and in NAD  Eyes: PERRL, EOMI, no redness or drainage  Nose: no drainage  Mouth: no lesions  Throat: no erythema, exudate or swelling  Neck: supple, symmetrical, trachea midline, no adenopathy and thyroid: not enlarged, symmetric, no tenderness/mass/nodules  Lungs:  clear to auscultation w/o rales, rhonchi, wheezes w/normal effort and no use of accessory muscles of respiration   Heart: regular rate and rhythm, S1, S2 normal, no murmur, click, rub or gallop  Abdomen: soft, nontender, no masses  Ext:  No edema noted.   Lymph: no cervical adenopathy appreciated  Skin:  Normal. and no rash or abnormalities   Neuro: normal gait, CN 2-12 intact  Psych: alert and oriented to person, place, time and situation and Speech: appropriate quality, quantity and organization of sentences and normal affect  Assessment/Plan:       ICD-10-CM ICD-9-CM    1. Encounter for preventive care  Z00.00 V70.0    2. Vestibular migraine  G43.809 346.80    3. Multiple thyroid nodules  E04.2 241.1    4. Essential tremor  G25.0 333.1    5. Seasonal allergic rhinitis due to pollen  J30.1 477.0    6. Gluten intolerance  K90.41 579.0    7. Encounter for hepatitis C screening test for low risk patient  Z11.59 V73.89    8. Encounter for immunization  Z23 V03.89 INFLUENZA VIRUS VAC QUAD,SPLIT,PRESV FREE SYRINGE IM      UT IMMUNIZ ADMIN,1 SINGLE/COMB VAC/TOXOID        Orders Placed This Encounter    UT IMMUNIZ ADMIN,1 SINGLE/COMB VAC/TOXOID    INFLUENZA VIRUS VACCINE QUADRIVALENT, PRESERVATIVE FREE SYRINGE (80333)    SUMAtriptan (IMITREX) 50 mg tablet    promethazine (PHENERGAN) 25 mg tablet    naproxen (NAPROSYN) 500 mg tablet    nadoloL (CORGARD) 20 mg tablet    nortriptyline (PAMELOR) 25 mg capsule     Preventive up to date; encouraged continued healthy lifestyle!   Discussed doing colonoscopy in next few years, she will let me know when ready but doesn't want to do during covid as it is not urgent  No labs needed as were so good last year and she generally feels well  She does ultrasounds and will recheck her thyroid nodules and let me know what she finds  The lymphadenopathy in neck resolved, thankfulliy  The migraines are infrequent with the nightly nadolol and nortriptyline  However, needs something to take when they happen  Will try imitrex, could also try phenergan and/or high dose naproxen if needed  Risks/benefits reviewed  The nadolol is working well to prevent her essential tremor as well  She is avoiding estrogen due to migraine which I think is wise but does have significant ovulation symptoms  She thinks she can wait it out until menopause; not wanting surgery    Discussed the diagnosis and plan and she expressed understanding. Follow-up and Dispositions    · Return in about 1 year (around 10/20/2021).          Skylar Avila MD

## 2020-10-20 ENCOUNTER — OFFICE VISIT (OUTPATIENT)
Dept: FAMILY MEDICINE CLINIC | Age: 45
End: 2020-10-20
Payer: COMMERCIAL

## 2020-10-20 VITALS
TEMPERATURE: 99.1 F | OXYGEN SATURATION: 98 % | SYSTOLIC BLOOD PRESSURE: 116 MMHG | HEART RATE: 87 BPM | BODY MASS INDEX: 22.07 KG/M2 | DIASTOLIC BLOOD PRESSURE: 70 MMHG | WEIGHT: 149 LBS | RESPIRATION RATE: 18 BRPM | HEIGHT: 69 IN

## 2020-10-20 DIAGNOSIS — N94.0 OVULATION PAIN: ICD-10-CM

## 2020-10-20 DIAGNOSIS — E04.2 MULTIPLE THYROID NODULES: ICD-10-CM

## 2020-10-20 DIAGNOSIS — G43.809 VESTIBULAR MIGRAINE: ICD-10-CM

## 2020-10-20 DIAGNOSIS — Z11.59 ENCOUNTER FOR HEPATITIS C SCREENING TEST FOR LOW RISK PATIENT: ICD-10-CM

## 2020-10-20 DIAGNOSIS — J30.1 SEASONAL ALLERGIC RHINITIS DUE TO POLLEN: ICD-10-CM

## 2020-10-20 DIAGNOSIS — G25.0 ESSENTIAL TREMOR: ICD-10-CM

## 2020-10-20 DIAGNOSIS — Z23 ENCOUNTER FOR IMMUNIZATION: ICD-10-CM

## 2020-10-20 DIAGNOSIS — K90.41 GLUTEN INTOLERANCE: ICD-10-CM

## 2020-10-20 DIAGNOSIS — Z00.00 ENCOUNTER FOR PREVENTIVE CARE: Primary | ICD-10-CM

## 2020-10-20 PROBLEM — O24.419 GESTATIONAL DIABETES MELLITUS: Status: ACTIVE | Noted: 2018-03-21

## 2020-10-20 PROBLEM — O12.00 GESTATIONAL EDEMA: Status: ACTIVE | Noted: 2018-05-22

## 2020-10-20 PROBLEM — Z86.32 HISTORY OF GESTATIONAL DIABETES: Status: ACTIVE | Noted: 2020-10-20

## 2020-10-20 PROBLEM — Z87.59 HISTORY OF PREGNANCY INDUCED HYPERTENSION: Status: ACTIVE | Noted: 2020-10-20

## 2020-10-20 PROBLEM — O13.9 PREGNANCY-INDUCED HYPERTENSION: Status: ACTIVE | Noted: 2018-06-01

## 2020-10-20 PROBLEM — O09.519 ELDERLY PRIMIGRAVIDA: Status: ACTIVE | Noted: 2017-10-30

## 2020-10-20 PROBLEM — O99.810 ABNORMAL GLUCOSE TOLERANCE TEST IN PREGNANCY, ANTEPARTUM: Status: ACTIVE | Noted: 2018-03-08

## 2020-10-20 PROBLEM — O09.90 HIGH-RISK PREGNANCY: Status: ACTIVE | Noted: 2017-11-28

## 2020-10-20 PROBLEM — O13.9 PREGNANCY-INDUCED HYPERTENSION: Status: RESOLVED | Noted: 2018-06-01 | Resolved: 2020-10-20

## 2020-10-20 PROBLEM — O09.90 HIGH-RISK PREGNANCY: Status: RESOLVED | Noted: 2017-11-28 | Resolved: 2020-10-20

## 2020-10-20 PROBLEM — R77.2: Status: ACTIVE | Noted: 2018-01-15

## 2020-10-20 PROBLEM — R77.2: Status: RESOLVED | Noted: 2018-01-15 | Resolved: 2020-10-20

## 2020-10-20 PROBLEM — O24.419 GESTATIONAL DIABETES MELLITUS: Status: RESOLVED | Noted: 2018-03-21 | Resolved: 2020-10-20

## 2020-10-20 PROBLEM — O99.810 ABNORMAL GLUCOSE TOLERANCE TEST IN PREGNANCY, ANTEPARTUM: Status: RESOLVED | Noted: 2018-03-08 | Resolved: 2020-10-20

## 2020-10-20 PROBLEM — O09.519 ELDERLY PRIMIGRAVIDA: Status: RESOLVED | Noted: 2017-10-30 | Resolved: 2020-10-20

## 2020-10-20 PROBLEM — O12.00 GESTATIONAL EDEMA: Status: RESOLVED | Noted: 2018-05-22 | Resolved: 2020-10-20

## 2020-10-20 PROCEDURE — 90471 IMMUNIZATION ADMIN: CPT | Performed by: FAMILY MEDICINE

## 2020-10-20 PROCEDURE — 90686 IIV4 VACC NO PRSV 0.5 ML IM: CPT | Performed by: FAMILY MEDICINE

## 2020-10-20 PROCEDURE — 99396 PREV VISIT EST AGE 40-64: CPT | Performed by: FAMILY MEDICINE

## 2020-10-20 PROCEDURE — 99214 OFFICE O/P EST MOD 30 MIN: CPT | Performed by: FAMILY MEDICINE

## 2020-10-20 RX ORDER — NAPROXEN 500 MG/1
500 TABLET ORAL
Qty: 30 TAB | Refills: 3 | Status: SHIPPED | OUTPATIENT
Start: 2020-10-20

## 2020-10-20 RX ORDER — SUMATRIPTAN 50 MG/1
50 TABLET, FILM COATED ORAL
Qty: 9 TAB | Refills: 12 | Status: SHIPPED | OUTPATIENT
Start: 2020-10-20

## 2020-10-20 RX ORDER — NADOLOL 20 MG/1
10 TABLET ORAL
Qty: 45 TAB | Refills: 3 | Status: SHIPPED | OUTPATIENT
Start: 2020-10-20

## 2020-10-20 RX ORDER — NORTRIPTYLINE HYDROCHLORIDE 25 MG/1
25 CAPSULE ORAL
Qty: 90 CAP | Refills: 3 | Status: SHIPPED | OUTPATIENT
Start: 2020-10-20

## 2020-10-20 RX ORDER — PROMETHAZINE HYDROCHLORIDE 25 MG/1
25 TABLET ORAL
Qty: 30 TAB | Refills: 1 | Status: SHIPPED | OUTPATIENT
Start: 2020-10-20

## 2020-10-20 NOTE — PROGRESS NOTES
Chief Complaint   Patient presents with    Headache     Medication f/u     1. Have you been to the ER, urgent care clinic since your last visit? Hospitalized since your last visit? No    2. Have you seen or consulted any other health care providers outside of the [address] [place] [address] since your last visit? Include any pap smears or colon screening. No      Updated viis was given to patient/guardian prior to administering vaccine. Opportunity was presented for questions. No concerns were verbalized prior to administration.

## 2022-03-18 PROBLEM — G25.0 ESSENTIAL TREMOR: Status: ACTIVE | Noted: 2019-11-26

## 2022-03-18 PROBLEM — G43.809 VESTIBULAR MIGRAINE: Status: ACTIVE | Noted: 2019-11-26

## 2022-03-18 PROBLEM — E73.9 LACTOSE INTOLERANCE: Status: ACTIVE | Noted: 2017-03-07

## 2022-03-18 PROBLEM — Z83.438 FAMILY HISTORY OF HYPERLIPIDEMIA: Status: ACTIVE | Noted: 2017-03-07

## 2022-03-18 PROBLEM — K58.2 IRRITABLE BOWEL SYNDROME WITH BOTH CONSTIPATION AND DIARRHEA: Status: ACTIVE | Noted: 2017-03-07

## 2022-03-18 PROBLEM — K90.41 GLUTEN INTOLERANCE: Status: ACTIVE | Noted: 2017-03-07

## 2022-03-19 PROBLEM — E04.2 MULTIPLE THYROID NODULES: Status: ACTIVE | Noted: 2020-10-20

## 2022-03-19 PROBLEM — Z86.32 HISTORY OF GESTATIONAL DIABETES: Status: ACTIVE | Noted: 2020-10-20

## 2022-03-19 PROBLEM — Z87.59 HISTORY OF PREGNANCY INDUCED HYPERTENSION: Status: ACTIVE | Noted: 2020-10-20

## 2022-03-20 PROBLEM — J30.1 SEASONAL ALLERGIC RHINITIS DUE TO POLLEN: Status: ACTIVE | Noted: 2017-03-07

## 2023-05-17 RX ORDER — PROMETHAZINE HYDROCHLORIDE 25 MG/1
25 TABLET ORAL EVERY 6 HOURS PRN
COMMUNITY
Start: 2020-10-20

## 2023-05-17 RX ORDER — NADOLOL 20 MG/1
10 TABLET ORAL
COMMUNITY
Start: 2020-10-20

## 2023-05-17 RX ORDER — NAPROXEN 500 MG/1
500 TABLET ORAL 2 TIMES DAILY PRN
COMMUNITY
Start: 2020-10-20

## 2023-05-17 RX ORDER — SUMATRIPTAN 50 MG/1
50 TABLET, FILM COATED ORAL 2 TIMES DAILY PRN
COMMUNITY
Start: 2020-10-20

## 2023-05-17 RX ORDER — ACETAMINOPHEN AND CODEINE PHOSPHATE 120; 12 MG/5ML; MG/5ML
1 SOLUTION ORAL DAILY
COMMUNITY
Start: 2019-10-06

## 2023-05-17 RX ORDER — NAPROXEN SODIUM 220 MG
220 TABLET ORAL 2 TIMES DAILY WITH MEALS
COMMUNITY

## 2023-05-17 RX ORDER — CETIRIZINE HYDROCHLORIDE 10 MG/1
10 TABLET ORAL DAILY PRN
COMMUNITY

## 2023-05-17 RX ORDER — NORTRIPTYLINE HYDROCHLORIDE 25 MG/1
25 CAPSULE ORAL
COMMUNITY
Start: 2020-10-20